# Patient Record
Sex: MALE | Race: WHITE | NOT HISPANIC OR LATINO | Employment: FULL TIME | URBAN - METROPOLITAN AREA
[De-identification: names, ages, dates, MRNs, and addresses within clinical notes are randomized per-mention and may not be internally consistent; named-entity substitution may affect disease eponyms.]

---

## 2018-03-30 ENCOUNTER — OFFICE VISIT CONVERTED (OUTPATIENT)
Dept: CARDIOLOGY | Facility: CLINIC | Age: 23
End: 2018-03-30
Attending: SPECIALIST

## 2018-05-04 ENCOUNTER — OFFICE VISIT CONVERTED (OUTPATIENT)
Dept: FAMILY MEDICINE CLINIC | Age: 23
End: 2018-05-04
Attending: NURSE PRACTITIONER

## 2018-09-04 ENCOUNTER — OFFICE VISIT CONVERTED (OUTPATIENT)
Dept: FAMILY MEDICINE CLINIC | Age: 23
End: 2018-09-04
Attending: NURSE PRACTITIONER

## 2018-09-24 ENCOUNTER — OFFICE VISIT CONVERTED (OUTPATIENT)
Dept: FAMILY MEDICINE CLINIC | Age: 23
End: 2018-09-24
Attending: NURSE PRACTITIONER

## 2019-05-15 ENCOUNTER — OFFICE VISIT CONVERTED (OUTPATIENT)
Dept: FAMILY MEDICINE CLINIC | Age: 24
End: 2019-05-15
Attending: NURSE PRACTITIONER

## 2019-07-12 ENCOUNTER — OFFICE VISIT CONVERTED (OUTPATIENT)
Dept: FAMILY MEDICINE CLINIC | Age: 24
End: 2019-07-12
Attending: NURSE PRACTITIONER

## 2019-10-02 ENCOUNTER — OFFICE VISIT CONVERTED (OUTPATIENT)
Dept: FAMILY MEDICINE CLINIC | Age: 24
End: 2019-10-02
Attending: NURSE PRACTITIONER

## 2019-10-09 ENCOUNTER — OFFICE VISIT CONVERTED (OUTPATIENT)
Dept: FAMILY MEDICINE CLINIC | Age: 24
End: 2019-10-09
Attending: FAMILY MEDICINE

## 2019-10-21 ENCOUNTER — OFFICE VISIT (OUTPATIENT)
Dept: ORTHOPEDIC SURGERY | Facility: CLINIC | Age: 24
End: 2019-10-21

## 2019-10-21 VITALS — BODY MASS INDEX: 39.66 KG/M2 | HEIGHT: 72 IN | WEIGHT: 292.8 LBS | TEMPERATURE: 98.1 F

## 2019-10-21 DIAGNOSIS — M25.552 BILATERAL HIP PAIN: ICD-10-CM

## 2019-10-21 DIAGNOSIS — M25.551 BILATERAL HIP PAIN: ICD-10-CM

## 2019-10-21 DIAGNOSIS — S42.292A OTHER CLOSED DISPLACED FRACTURE OF PROXIMAL END OF LEFT HUMERUS, INITIAL ENCOUNTER: Primary | ICD-10-CM

## 2019-10-21 DIAGNOSIS — M79.641 BILATERAL HAND PAIN: ICD-10-CM

## 2019-10-21 DIAGNOSIS — M79.642 BILATERAL HAND PAIN: ICD-10-CM

## 2019-10-21 DIAGNOSIS — M25.532 BILATERAL WRIST PAIN: ICD-10-CM

## 2019-10-21 DIAGNOSIS — M25.531 BILATERAL WRIST PAIN: ICD-10-CM

## 2019-10-21 DIAGNOSIS — M25.512 ACUTE PAIN OF LEFT SHOULDER: ICD-10-CM

## 2019-10-21 PROCEDURE — 73100 X-RAY EXAM OF WRIST: CPT | Performed by: PHYSICIAN ASSISTANT

## 2019-10-21 PROCEDURE — 99204 OFFICE O/P NEW MOD 45 MIN: CPT | Performed by: PHYSICIAN ASSISTANT

## 2019-10-21 PROCEDURE — 73120 X-RAY EXAM OF HAND: CPT | Performed by: PHYSICIAN ASSISTANT

## 2019-10-21 PROCEDURE — 23600 CLTX PROX HUMRL FX W/O MNPJ: CPT | Performed by: PHYSICIAN ASSISTANT

## 2019-10-21 PROCEDURE — 73060 X-RAY EXAM OF HUMERUS: CPT | Performed by: PHYSICIAN ASSISTANT

## 2019-10-21 PROCEDURE — 72170 X-RAY EXAM OF PELVIS: CPT | Performed by: PHYSICIAN ASSISTANT

## 2019-10-21 RX ORDER — PANTOPRAZOLE SODIUM 20 MG/1
20 TABLET, DELAYED RELEASE ORAL DAILY
COMMUNITY
End: 2021-10-15

## 2019-10-22 RX ORDER — HYDROCODONE BITARTRATE AND ACETAMINOPHEN 5; 325 MG/1; MG/1
TABLET ORAL
Qty: 40 TABLET | Refills: 0 | Status: SHIPPED | OUTPATIENT
Start: 2019-10-22 | End: 2019-10-30 | Stop reason: SDUPTHER

## 2019-10-22 NOTE — TELEPHONE ENCOUNTER
Patient is requesting a refill of Norco 5/325MG He was seen by Alhaji yesterday for a humerus fracture.     Please Advise. ALEXIS on file

## 2019-10-30 RX ORDER — HYDROCODONE BITARTRATE AND ACETAMINOPHEN 5; 325 MG/1; MG/1
TABLET ORAL
Qty: 40 TABLET | Refills: 0 | Status: SHIPPED | OUTPATIENT
Start: 2019-10-30 | End: 2019-12-09

## 2019-10-30 NOTE — TELEPHONE ENCOUNTER
Patient is requesting a refill of Norco 5/325MG Please Advise.     Left humerus fracture from 10/21/19 seen by S.     Please Advise.

## 2019-10-30 NOTE — TELEPHONE ENCOUNTER
Will be fine to renew his Norco prescription.  Please go ahead and send it to me electronically and I will sign off on it.  Thank you.  Norco 5/325 mg tab 1 p.o. every 6 total 30 pills no refills.

## 2019-10-31 PROBLEM — M25.512 ACUTE PAIN OF LEFT SHOULDER: Status: ACTIVE | Noted: 2019-10-31

## 2019-10-31 PROBLEM — M25.552 BILATERAL HIP PAIN: Status: ACTIVE | Noted: 2019-10-31

## 2019-10-31 PROBLEM — M25.551 BILATERAL HIP PAIN: Status: ACTIVE | Noted: 2019-10-31

## 2019-10-31 PROBLEM — M79.641 BILATERAL HAND PAIN: Status: ACTIVE | Noted: 2019-10-31

## 2019-10-31 PROBLEM — M25.531 BILATERAL WRIST PAIN: Status: ACTIVE | Noted: 2019-10-31

## 2019-10-31 PROBLEM — M25.532 BILATERAL WRIST PAIN: Status: ACTIVE | Noted: 2019-10-31

## 2019-10-31 PROBLEM — M79.642 BILATERAL HAND PAIN: Status: ACTIVE | Noted: 2019-10-31
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
## 2019-11-01 PROBLEM — S42.202A CLOSED FRACTURE OF LEFT PROXIMAL HUMERUS: Status: ACTIVE | Noted: 2019-11-01

## 2019-11-08 ENCOUNTER — OFFICE VISIT CONVERTED (OUTPATIENT)
Dept: FAMILY MEDICINE CLINIC | Age: 24
End: 2019-11-08
Attending: NURSE PRACTITIONER

## 2019-11-08 ENCOUNTER — HOSPITAL ENCOUNTER (OUTPATIENT)
Dept: OTHER | Facility: HOSPITAL | Age: 24
Discharge: HOME | End: 2019-11-08
Attending: NURSE PRACTITIONER

## 2019-11-10 LAB — BACTERIA SPEC AEROBE CULT: ABNORMAL

## 2019-11-18 ENCOUNTER — OFFICE VISIT (OUTPATIENT)
Dept: ORTHOPEDIC SURGERY | Facility: CLINIC | Age: 24
End: 2019-11-18

## 2019-11-18 VITALS — WEIGHT: 293 LBS | BODY MASS INDEX: 41.95 KG/M2 | HEIGHT: 70 IN | TEMPERATURE: 98 F

## 2019-11-18 DIAGNOSIS — R20.0 NUMBNESS OF RIGHT HAND: ICD-10-CM

## 2019-11-18 DIAGNOSIS — M79.641 BILATERAL HAND PAIN: ICD-10-CM

## 2019-11-18 DIAGNOSIS — S69.91XD HAND INJURY, RIGHT, SUBSEQUENT ENCOUNTER: ICD-10-CM

## 2019-11-18 DIAGNOSIS — M79.642 BILATERAL HAND PAIN: ICD-10-CM

## 2019-11-18 DIAGNOSIS — S42.202D CLOSED FRACTURE OF PROXIMAL END OF LEFT HUMERUS WITH ROUTINE HEALING, UNSPECIFIED FRACTURE MORPHOLOGY, SUBSEQUENT ENCOUNTER: Primary | ICD-10-CM

## 2019-11-18 PROCEDURE — 73130 X-RAY EXAM OF HAND: CPT | Performed by: PHYSICIAN ASSISTANT

## 2019-11-18 PROCEDURE — 99214 OFFICE O/P EST MOD 30 MIN: CPT | Performed by: PHYSICIAN ASSISTANT

## 2019-11-18 PROCEDURE — 73030 X-RAY EXAM OF SHOULDER: CPT | Performed by: PHYSICIAN ASSISTANT

## 2019-11-19 PROBLEM — S69.91XD HAND INJURY, RIGHT, SUBSEQUENT ENCOUNTER: Status: ACTIVE | Noted: 2019-11-19

## 2019-11-19 PROBLEM — R20.0 NUMBNESS OF RIGHT HAND: Status: ACTIVE | Noted: 2019-11-19

## 2019-11-19 RX ORDER — HYDROCODONE BITARTRATE AND ACETAMINOPHEN 5; 325 MG/1; MG/1
1 TABLET ORAL SEE ADMIN INSTRUCTIONS
Qty: 30 TABLET | Refills: 0 | Status: SHIPPED | OUTPATIENT
Start: 2019-11-19 | End: 2019-12-09

## 2019-11-19 NOTE — PROGRESS NOTES
GLOBAL FRACTURE CARE/FOLLOW UP      NAME: Cachorro Tsai  ?  : 1995  ?  MRN: [id number]    Chief Complaint   Patient presents with   • Left Shoulder - Follow-up   • Right Hand - Follow-up     ?  Date of fracture: 10/19/2019    HPI:   Patient returns today for 4-week follow up of left displaced proximal humerus fracture.  Patient reports he is still in a significant amount of pain with any attempted abduction or flexion of the arm. He has been out of pain medication for about one week and states ibuprofen is not helping much. He also reports numbness in the dorsum of the right hand since the incident. He states there is also some numbness into the right thumb. He denies any pain of the hand at this time. He denies any further injury and states the numbness is constant. He does not know of any modifying factors to make the numbness worse or better. The symptoms began shortly after the incident on 10/19/19. There is no impaired  strength.    This patient is an established patient.  The complaint of numbness in right hand is new to this provider.    History reviewed. No pertinent past medical history.  Family History   Problem Relation Age of Onset   • Diabetes Other    • Hypertension Other      Social History     Tobacco Use   • Smoking status: Current Every Day Smoker   • Smokeless tobacco: Never Used   Substance Use Topics   • Alcohol use: Yes   • Drug use: Defer     Past Surgical History:   Procedure Laterality Date   • WISDOM TOOTH EXTRACTION         Current Outpatient Medications:   •  HYDROcodone-acetaminophen (NORCO) 5-325 MG per tablet, Take 1 tablet by mouth every 4-6 hours prn pain, Disp: 40 tablet, Rfl: 0  •  pantoprazole (PROTONIX) 20 MG EC tablet, Take 20 mg by mouth Daily., Disp: , Rfl:       Review of Systems   Constitutional: Negative.    HENT: Negative.    Eyes: Negative.    Respiratory: Negative.    Cardiovascular: Negative.    Endocrine: Negative.    Musculoskeletal: Positive for  arthralgias.   Skin: Negative.    Allergic/Immunologic: Negative.    Neurological: Positive for numbness (right hand).   Hematological: Negative.    Psychiatric/Behavioral: Negative.        Physical Exam  Constitutional: Patient is oriented to person, place, and time. Appears well-developed and well-nourished.   HENT:   Head: Normocephalic and atraumatic.   Eyes: Conjunctivae and EOM are normal. Pupils are equal, round, and reactive to light.   Cardiovascular: Normal rate and intact distal pulses.   Pulmonary/Chest: Effort normal and breath sounds normal.   Musculoskeletal:   See detailed exam below   Neurological: Alert and oriented to person, place, and time.  Coordination normal. There is disturbance of 2 point discrimination of the dorsum of the right hand.  Skin: Skin is warm and dry. Capillary refill takes less than 2 seconds. No rash noted. No erythema.   Psychiatric: Patient has a normal mood and affect. His behavior is normal. Judgment and thought content normal.   Nursing note and vitals reviewed.      Ortho Exam:   Left shoulder-proximal humerus fracture. There continues to be a significant amount of tenderness over the greater tuberosity of the humerus. There is some tenderness over the lesser tuberosity as well. External rotation is extremely limited and painful. Rotator cuff function is inhibited because of pain and tenderness at the insertion of the rotator cuff on the greater tuberosity of the humerus. Axillary nerve function is well preserved. Biceps function is intact. Radial artey pulses are palpable. Apprehension sign is negative. Both active and passive ranges of motion are extremely limited and painful for the patient. There is no clinical deformity. The pain level is 7.    Right hand: The skin is within normal limits and without bruising or swelling. Radial pulse is palpable. Capillary refill is 2 seconds with a brisk return. There is some altered 2 point discrimination over the dorsum of the  hand.  strength is within normal limits.  There is no muscle wasting or atrophy.  Flexor and extensor tendon functions are well preserved. No evidence of RSD.  There is no clinical evidence of renal disease, thyroid disease or any generalized neurological condition that could be causing nerve dysfunction.      Diagnostic Studies:  X-Ray Report:  Left shoulder(s) X-Ray  Indication: Evaluation of left shoulder pain  AP, Lateral views  Findings: displaced fracture of left humeral head with displaced fracture fragments proximal to humeral head, no acute change in fracture fragments since last x-rays  Bony lesion: no  Soft tissues: within normal limits  Joint spaces: abnormal  Hardware appropriately positioned: not applicable  Prior studies available for comparison: yes   X-RAY was ordered and reviewed by Alhaji Pryor PA-C    X-Ray Report:  Right Hand(s) X-Ray  Indication: Evaluation of right hand numbness  AP, Lateral views  Findings: No acute bony abnormality seen on x-rays.  At last visit we discussed a possible old fracture at the fifth metacarpal versus an acute fracture, after seeing today's x-rays it is confirmed that the area is not acute.  Bony lesion: no  Soft tissues: within normal limits  Joint spaces: within normal limits  Hardware appropriately positioned: not applicable  Prior studies available for comparison: yes   X-RAY was ordered and reviewed by Alhaji Pryor PA-C        Assessment:  Cachorro was seen today for follow-up and follow-up.    Diagnoses and all orders for this visit:    Closed fracture of proximal end of left humerus with routine healing, unspecified fracture morphology, subsequent encounter  -     XR Shoulder 2+ View Left  -     MRI Shoulder Left Without Contrast; Future    Bilateral hand pain  -     XR Hand 3+ View Right    Numbness of right hand  -     EMG & Nerve Conduction Test; Future    Hand injury, right, subsequent encounter  -     EMG & Nerve Conduction Test;  Future          Plan   · We will discuss further pain control with Dr. Holloway and send in to patient's pharmacy.  · Discussed proceeding with left shoulder MRI to further evaluate the rotator cuff and location of fracture fragments.  · We will proceed with ordering an EMG/nerve conduction study of the right upper extremity to further assess numbness on the dorsum of the right hand.  Continue ice as needed  Gentle active ROM-we discussed referral to formal physical therapy but will attempt to have MRI back prior to doing so due to the fracture fragment placement.  OTC tylenol or ibuprofen as needed (if clinically appropriate)  Fall precautions  Patient is to remain off work    Date of encounter: 11/18/2019  Alhaji Pryor PA-C      Electronically signed by Alhaji Pryor PA-C, 11/19/19, 12:14 PM.

## 2019-11-21 ENCOUNTER — TELEPHONE (OUTPATIENT)
Dept: ORTHOPEDIC SURGERY | Facility: CLINIC | Age: 24
End: 2019-11-21

## 2019-12-05 ENCOUNTER — TELEPHONE (OUTPATIENT)
Dept: ORTHOPEDIC SURGERY | Facility: CLINIC | Age: 24
End: 2019-12-05

## 2019-12-05 NOTE — TELEPHONE ENCOUNTER
Patient had a anxiety attack when he was having his MRI. Per Dr. Holloway we will call in Valium 10MG

## 2019-12-09 ENCOUNTER — OFFICE VISIT (OUTPATIENT)
Dept: ORTHOPEDIC SURGERY | Facility: CLINIC | Age: 24
End: 2019-12-09

## 2019-12-09 VITALS — WEIGHT: 293 LBS | BODY MASS INDEX: 39.68 KG/M2 | TEMPERATURE: 98.1 F | HEIGHT: 72 IN

## 2019-12-09 DIAGNOSIS — S46.012A TRAUMATIC COMPLETE TEAR OF LEFT ROTATOR CUFF, INITIAL ENCOUNTER: ICD-10-CM

## 2019-12-09 DIAGNOSIS — S64.8X2A: ICD-10-CM

## 2019-12-09 DIAGNOSIS — R20.0 NUMBNESS OF RIGHT HAND: ICD-10-CM

## 2019-12-09 DIAGNOSIS — S54.22XA LEFT RADIAL SENSORY NERVE INJURY, INITIAL ENCOUNTER: ICD-10-CM

## 2019-12-09 DIAGNOSIS — S42.202D CLOSED FRACTURE OF PROXIMAL END OF LEFT HUMERUS WITH ROUTINE HEALING, UNSPECIFIED FRACTURE MORPHOLOGY, SUBSEQUENT ENCOUNTER: Primary | ICD-10-CM

## 2019-12-09 PROCEDURE — 99024 POSTOP FOLLOW-UP VISIT: CPT | Performed by: PHYSICIAN ASSISTANT

## 2019-12-09 RX ORDER — HYDROCODONE BITARTRATE AND ACETAMINOPHEN 5; 325 MG/1; MG/1
1 TABLET ORAL EVERY 12 HOURS PRN
Qty: 20 TABLET | Refills: 0 | Status: CANCELLED | OUTPATIENT
Start: 2019-12-09

## 2019-12-10 DIAGNOSIS — S42.202D CLOSED FRACTURE OF PROXIMAL END OF LEFT HUMERUS WITH ROUTINE HEALING, UNSPECIFIED FRACTURE MORPHOLOGY, SUBSEQUENT ENCOUNTER: Primary | ICD-10-CM

## 2019-12-10 DIAGNOSIS — S46.012A TRAUMATIC COMPLETE TEAR OF LEFT ROTATOR CUFF, INITIAL ENCOUNTER: ICD-10-CM

## 2019-12-10 PROBLEM — S64.8X2A: Status: ACTIVE | Noted: 2019-12-10

## 2019-12-10 PROBLEM — S54.22XA: Status: ACTIVE | Noted: 2019-12-10

## 2019-12-10 RX ORDER — HYDROCODONE BITARTRATE AND ACETAMINOPHEN 5; 325 MG/1; MG/1
TABLET ORAL
Qty: 20 TABLET | Refills: 0 | Status: SHIPPED | OUTPATIENT
Start: 2019-12-10 | End: 2021-10-15

## 2019-12-10 NOTE — PROGRESS NOTES
FOLLOW UP VISIT    Patient: Cachorro Tsai  ?  YOB: 1995    MRN: [id number]  ?  Chief Complaint   Patient presents with   • Left Shoulder - Results, Pain   • Right Hand - Results, Numbness      ?  HPI:   23-year-old male patient returns today for follow-up on left shoulder pain, right hand numbness and results of EMG/NCS and MRI.  I have been treating patient for left displaced proximal humerus fracture following an injury in October of this year.  Since that time he is been complaining of numbness in the right hand and significant pain and difficulty with any movement of the left arm.  Today we will discuss EMG/NCS findings of right hand/right upper extremity and MRI findings of left shoulder.  Patient reports he is still in a moderate amount of discomfort, mostly at nighttime when trying to sleep.  He has been using the hydrocodone given at past visits for bedtime.  Pain in his shoulder is described as an aching/grinding/crushing pain that is moderate to severe in nature.  He reports pain constantly although it is worsened if he moves the left arm and feels better if he can rest it.  He also reports no range of motion and decreased strength as well as popping with any attempted movement of the shoulder.  He has been unable to return to work since date of injury.  In regards to his right hand he denies any decreased  strength and denies any pain but does continue to experience constant numbness in the hand.    This patient is an established patient.  This problem is not new to this examiner.      Allergies: No Known Allergies    Medications:   Home Medications:  Current Outpatient Medications on File Prior to Visit   Medication Sig   • pantoprazole (PROTONIX) 20 MG EC tablet Take 20 mg by mouth Daily.     No current facility-administered medications on file prior to visit.      Current Medications:  Scheduled Meds:  PRN Meds:.    I have reviewed the patient's medical history in detail and  "updated the computerized patient record.  Review and summarization of old records include:    History reviewed. No pertinent past medical history.  Past Surgical History:   Procedure Laterality Date   • WISDOM TOOTH EXTRACTION       Social History     Occupational History   • Not on file   Tobacco Use   • Smoking status: Current Every Day Smoker   • Smokeless tobacco: Never Used   Substance and Sexual Activity   • Alcohol use: Yes   • Drug use: Defer   • Sexual activity: Defer      Social History     Social History Narrative   • Not on file     Family History   Problem Relation Age of Onset   • Diabetes Other    • Hypertension Other          Review of Systems  Constitutional: Negative.    HENT: Negative.    Eyes: Negative.    Respiratory: Negative.    Cardiovascular: Negative.    Endocrine: Negative.    Musculoskeletal: Positive for arthralgias, decreased ROM, decreased strength.  Skin: Negative.    Allergic/Immunologic: Negative.    Neurological: Negative.    Hematological: Negative.    Psychiatric/Behavioral: Negative.           Wt Readings from Last 3 Encounters:   12/09/19 133 kg (293 lb)   11/18/19 133 kg (293 lb)   10/21/19 133 kg (292 lb 12.8 oz)     Ht Readings from Last 3 Encounters:   12/09/19 182.9 cm (72\")   11/18/19 177.8 cm (70\")   10/21/19 182.9 cm (72\")     Body mass index is 39.74 kg/m².  Facility age limit for growth percentiles is 20 years.  Vitals:    12/09/19 1537   Temp: 98.1 °F (36.7 °C)         Physical Exam  Constitutional: Patient is oriented to person, place, and time. Appears well-developed and well-nourished.   HENT:   Head: Normocephalic and atraumatic.   Eyes: Conjunctivae and EOM are normal. Pupils are equal, round, and reactive to light.   Cardiovascular: Normal rate and intact distal pulses.   Pulmonary/Chest: Effort normal and breath sounds normal.   Musculoskeletal:   See detailed exam below   Neurological: Alert and oriented to person, place, and time. No sensory deficit. " Coordination normal.   Skin: Skin is warm and dry. Capillary refill takes less than 2 seconds. No rash noted. No erythema.   Psychiatric: Patient has a normal mood and affect. His behavior is normal. Judgment and thought content normal.   Nursing note and vitals reviewed.      Ortho Exam:   Left shoulder-rct and proximal humerus fracture:   The shoulder is extremely tender anteriorly. There is tenderness over the insertion of the rotator cuff over the greater tuberosity of the humerus. Proximal migration of the humeral head is noted. AC joint is tender. Crossover adduction test is positive. Drop arm sign is positive. Forward flexion is 0-20 degrees, abduction is 0-20 degrees, and unable to perform external rotation. Sulcus sign is negative.  Skin and soft tissue tenderness is noted. Patient's strength in abduction and external rotation are extremely lacking. The pain level is 8.       Right hand:   The skin remains within normal limits and without bruising or swelling. Radial pulse is palpable. Capillary refill is 2 seconds with a brisk return. There is altered 2 point discrimination over the dorsum of the hand.  strength is within normal limits.  There is no muscle wasting or atrophy.  Flexor and extensor tendon functions are well preserved. No evidence of RSD.  There is no clinical evidence of renal disease, thyroid disease or any generalized neurological condition that could be causing nerve dysfunction.    The above information is historical although it has been reviewed and updated to reflect current findings.    Diagnostics:  Reviewed EMG/NCS test results, performed [facility]/3/[facility], with summary of impression below:  · Compromise of right superficial radial and dorsal ulnar cutaneous sensory nerves.  Responses are present but reduced compared to opposite arm.  · EMG shows normal study    Reviewed MRI results of left shoulder, performed 12/9/2019 at [facility], summary of impression  below:  · Full-thickness and full width tears of supraspinatus and infraspinatus tendons with retraction to 12 o'clock position of humeral head  · No labral tear  · Long head biceps tendon is intact  · Small glenohumeral joint effusion  · Mild atrophy of supraspinatus muscle  · Discussed MRI findings of the left shoulder with performing physician, Miguel Sims DO regarding humerus fracture.  There is no mention of fracture fragments on MRI but upon talking with Dr. Sims via telephone he advises he sees the fragments and will make an addendum.       Assessment:  Cachorro was seen today for results, pain, results and numbness.    Diagnoses and all orders for this visit:    Closed fracture of proximal end of left humerus with routine healing, unspecified fracture morphology, subsequent encounter  -     [facility]Procedural Request  -     Urinalysis With Culture If Indicated -; Future    Numbness of right hand  -     Ambulatory Referral to Neurology    Traumatic complete tear of left rotator cuff, initial encounter  -     [facility] Surgical/Procedural Request  -     Urinalysis With Culture If Indicated -; Future    Injury of cutaneous sensory nerve of left upper extremity at hand level, initial encounter  -     Ambulatory Referral to Neurology    Left radial sensory nerve injury, initial encounter  -     Ambulatory Referral to Neurology    Other orders  -     Follow Anesthesia Guidelines / Standing Orders; Future  -     Obtain informed consent        ?    Plan    · Discussed results with patient and his mother as well as treatment options.  Patient will be referred to Dr. Bacilio Llody for neurology consult regarding right hand symptoms.  Patient will also be scheduled for a left rotator cuff repair with Dr. Thai Holloway.  · Physical Therapy discussed   · Medication options discussed and recommended  · Surgical  options discussed and patient scheduled for left rotator cuff  repair  · Rest, ice, compression, and elevation (RICE) therapy  · Stretching and strengthening exercises  · Refill on hydrocodone sent to patient's pharmacy  Patient has been diagnosed with a rotator cuff tear.  These tears can range from partial tears to complete tears of the muscle and/or tendon. They can also be categorized in size by width as being small, medium or large.  Diagnosis is confirmed with MRI or CT/arthrogram.  Management of these tears can be conservative with injections, physical therapy, activity modification and use of NSAIDS as needed.  Surgery is the only way to actually fix these tears, as they will not heal or repair on their own.  These tears can usually be repaired with arthroscopic surgery, but occasionally open procedures are necessary.  Many factors can influence the outcomes. First, larger tears have a higher chance of failure from a healing perspective, although pain may improve nonetheless, potentially longer recovery and sometimes, depending on the quality of the tissue and the length of time the tear has been present, may not be repairable at all.  If the tear has been going on for a long time, the muscle can actually change to fatty tissue, and no longer act as muscle.  This can have a direct effect on not only the ability to repair the tear but also the outcome from the surgery itself both from a healing and functional perspective.  Therefore, the decision to proceed with surgery does have a time factor which can affect the quality of the tissue and reparability but also the potential for the tear to increase in size.  The longer you wait to repair the torn tendon there can be decreased potential for a successful outcome.  · Risks of surgery have been discussed with the patient in detail.  These risks include but are not limited to possibility of infection, DVT, continued pain, continued progression of arthritis, use of allograft tissue, limited range of motion and strength and  further surgical intervention.  Patient understands that the surgery will benefit mechanical symptoms of pain and instability but may not help with symptoms of arthritis.      Date of encounter: 12/09/2019   Alhaji Pryor PA-C    Electronically signed by Alhaji Pryor PA-C, 12/10/19, 7:33 AM.

## 2019-12-13 ENCOUNTER — OUTSIDE FACILITY SERVICE (OUTPATIENT)
Dept: ORTHOPEDIC SURGERY | Facility: CLINIC | Age: 24
End: 2019-12-13

## 2019-12-13 PROCEDURE — 23410 REPAIR ROTATOR CUFF ACUTE: CPT | Performed by: ORTHOPAEDIC SURGERY

## 2019-12-13 PROCEDURE — 29824 SHO ARTHRS SRG DSTL CLAVICLC: CPT | Performed by: ORTHOPAEDIC SURGERY

## 2019-12-20 ENCOUNTER — OFFICE VISIT (OUTPATIENT)
Dept: ORTHOPEDIC SURGERY | Facility: CLINIC | Age: 24
End: 2019-12-20

## 2019-12-20 VITALS — TEMPERATURE: 98.2 F | BODY MASS INDEX: 39.55 KG/M2 | HEIGHT: 72 IN | WEIGHT: 292 LBS

## 2019-12-20 DIAGNOSIS — S46.012D TRAUMATIC COMPLETE TEAR OF LEFT ROTATOR CUFF, SUBSEQUENT ENCOUNTER: Primary | ICD-10-CM

## 2019-12-20 PROCEDURE — 99024 POSTOP FOLLOW-UP VISIT: CPT | Performed by: ORTHOPAEDIC SURGERY

## 2019-12-20 RX ORDER — OXYCODONE HYDROCHLORIDE AND ACETAMINOPHEN 5; 325 MG/1; MG/1
1 TABLET ORAL EVERY 8 HOURS PRN
Qty: 30 TABLET | Refills: 0 | Status: SHIPPED | OUTPATIENT
Start: 2019-12-20 | End: 2021-10-15

## 2019-12-20 NOTE — PROGRESS NOTES
OTHER POST OP GLOBAL     NAME: Cachorro Tsai  ?  : 1995  ?  MRN: [id number]    Chief Complaint   Patient presents with   • Left Shoulder - Follow-up     ?  Date of surgery: The patient had a rotator cuff repair surgery on 2019.    HPI:   Patient returns today for 1 week follow up of left rotator cuff repair. Incision(s) and Arthroscopic portals healing nicely/as expected with no signs of infection. Patient reports doing well with no unusual complaints. Appears to be progressing appropriately.  He has appropriate amounts of swelling and bruising.  He is able to move his fingers without any problems.  There is no neurovascular compromise postoperatively.      Review of Systems:      Ortho Exam:   Left shoulder. The patient is status-post rotator cuff repair 1 week(s) . Incision is clean and healing well. Arthroscopic portals are clean. Appropriate amounts of swelling and bruising are noted. The patients pain is well controlled. The passive range of motion is abduction 0-30 degrees, flexion 0-30 degrees. Axillary nerve function is well preserved. Radial artery pulses are palpable.      Diagnostic Studies:  no diagnostic testing performed this visit      Assessment:  Cachorro was seen today for follow-up.    Diagnoses and all orders for this visit:    Traumatic complete tear of left rotator cuff, subsequent encounter          Plan   • Incision care  • Continue ice as needed  • Active/Active Assisted ROM of elbow, wrist and hand  • Stretching and strengthening exercises  • Tylenol 500-1000mg by mouth every 6 hours as needed for pain   • Fall precautions  • Follow up in 4 week(s).  • Given the patient a prescription of Percocet 5/325 mg tab 1 p.o. every 8 PRN pain total 30 pills no refills.  I have encouraged him to cut back on his pain medication and to use ibuprofen in conjunction.  • Given the patient a note to be off work.  • We will start aggressive physical therapy upon his return to the  office for reevaluation in 4 weeks.  • Controlled substance treatment options discussed in detail. Patient's signed consent to medical options on file. ALEXIS form in chart.  The patient is being provided this narcotic prescription to address the acute medical condition that they are undergoing/experiencing at this time.  It is my medical and surgical assessment that more than 3 days of narcotic medication is necessary to help control the pain and discomfort that this patient is experiencing at this point.  Risks of narcotic medication usage outlined.  Possibility of physical and psychological dependence and abuse, especially long term, emphasized to the patient.  I have explained to the patient that we will try to wean them off the narcotic medication as soon as possible and introduce non-narcotic modalities of pain control.  At this point in the patient's clinical spectrum, however, alternative available treatments are inadequate to control their pain and symptoms.  I have also discussed the possibility of random drug testing as well as pill counts to prevent misuse and misappropriation of the narcotic medications prescribed to the patient.    Date of encounter: 12/20/2019  Thai Holloway MD

## 2020-01-17 ENCOUNTER — OFFICE VISIT (OUTPATIENT)
Dept: ORTHOPEDIC SURGERY | Facility: CLINIC | Age: 25
End: 2020-01-17

## 2020-01-17 VITALS — BODY MASS INDEX: 39.74 KG/M2 | WEIGHT: 293.4 LBS | HEIGHT: 72 IN | TEMPERATURE: 97.7 F

## 2020-01-17 DIAGNOSIS — Z98.890 S/P LEFT ROTATOR CUFF REPAIR: Primary | ICD-10-CM

## 2020-01-17 DIAGNOSIS — S46.012D TRAUMATIC COMPLETE TEAR OF LEFT ROTATOR CUFF, SUBSEQUENT ENCOUNTER: ICD-10-CM

## 2020-01-17 DIAGNOSIS — S42.202D CLOSED FRACTURE OF PROXIMAL END OF LEFT HUMERUS WITH ROUTINE HEALING, UNSPECIFIED FRACTURE MORPHOLOGY, SUBSEQUENT ENCOUNTER: ICD-10-CM

## 2020-01-17 PROCEDURE — 99024 POSTOP FOLLOW-UP VISIT: CPT | Performed by: PHYSICIAN ASSISTANT

## 2020-01-28 PROBLEM — Z98.890 S/P LEFT ROTATOR CUFF REPAIR: Status: ACTIVE | Noted: 2020-01-28

## 2020-01-28 NOTE — PROGRESS NOTES
OTHER POST OP GLOBAL     NAME: Cachorro Tsai  ?  : 1995  ?  MRN: [id number]    Chief Complaint   Patient presents with   • Left Shoulder - Post-op, Follow-up     ?  Date of surgery: 2019    HPI:   Patient returns today for 5 week follow up of left rotator cuff repair. Incision/arthroscopic portals healed nicely with no signs of infection.  There are what appears to be 2 retained sutures at the most distal and proximal ends of the incision.  Patient reports doing well with no unusual complaints.  He reports his pain is much better than it was prior to surgery.  He is able to sleep more comfortably now and move the shoulder without significant pain he had prior.      Review of Systems:      Ortho Exam:   Left shoulder:  The patient is status-post rotator cuff repair 5 week(s) . Incision has healed well. Arthroscopic portals are clean. Appropriate amounts of swelling and bruising are noted. The patients pain is well controlled. The passive range of motion is abduction 0-90 degrees, flexion 0-100 degrees. Axillary nerve function is well preserved. Radial artery pulses are palpable.      Diagnostic Studies:  no diagnostic testing performed this visit      Assessment:  Cachorro was seen today for post-op and follow-up.    Diagnoses and all orders for this visit:    Traumatic complete tear of left rotator cuff, subsequent encounter  -     Ambulatory Referral to Physical Therapy Evaluate and treat, POST OP    S/P left rotator cuff repair  -     Ambulatory Referral to Physical Therapy Evaluate and treat, POST OP    Closed fracture of proximal end of left humerus with routine healing, unspecified fracture morphology, subsequent encounter  -     Ambulatory Referral to Physical Therapy Evaluate and treat, POST OP          Plan   Incision care-to retained sutures were removed in office  Continue ice as needed  Stretching and strengthening exercises/to begin PT/OT -referral has been made  Alternate Ibuprofen  and Tylenol as needed  Fall precautions  He is to remain off work until next follow-up  Follow up in 4 weeks with Dr. Holloway    Date of encounter: 01/17/2020  Alhaji Pryor PA-C      Electronically signed by Alhaji Pryor PA-C, 01/28/20, 6:52 PM.

## 2020-02-13 ENCOUNTER — OFFICE VISIT (OUTPATIENT)
Dept: ORTHOPEDIC SURGERY | Facility: CLINIC | Age: 25
End: 2020-02-13

## 2020-02-13 VITALS — HEIGHT: 72 IN | TEMPERATURE: 97.7 F | WEIGHT: 295 LBS | BODY MASS INDEX: 39.96 KG/M2

## 2020-02-13 DIAGNOSIS — Z09 POSTOPERATIVE FOLLOW-UP: ICD-10-CM

## 2020-02-13 DIAGNOSIS — Z98.890 S/P LEFT ROTATOR CUFF REPAIR: Primary | ICD-10-CM

## 2020-02-13 PROCEDURE — 99024 POSTOP FOLLOW-UP VISIT: CPT | Performed by: ORTHOPAEDIC SURGERY

## 2020-02-13 NOTE — PROGRESS NOTES
OTHER POST OP GLOBAL     NAME: Cachorro Tsai  ?  : 1995  ?  MRN: [id number]    Chief Complaint   Patient presents with   • Left Shoulder - Follow-up, Post-op     ?  Date of surgery: Rotator cuff repair on 2019.    HPI:   Patient returns today for 8 week follow up of left rotator cuff repair. Incision(s) and Arthroscopic portals healed nicely with no signs of infection. Patient reports doing well with no unusual complaints. Appears to be progressing appropriately.  States that he is doing very well postoperatively.  He is not using any narcotic medication at this point.  His range of motion is slowly and progressively improving.  He states that he has completed his course of physical therapy and is currently under a home exercise program.  He also states that unfortunately he has been fired from his job and is looking for a new position.  He was terminated because he could not attend his normal duties on account of injury to his shoulder and subsequent surgical requirements.      Review of Systems:      Ortho Exam:   Left shoulder. The patient is status-post rotator cuff repair 8 week(s) . Incision is clean and healing well. Arthroscopic portals are clean. Appropriate amounts of swelling and bruising are noted. The patients pain is well controlled. The passive range of motion is abduction 0-120 degrees, flexion 0-120 degrees. Axillary nerve function is well preserved. Radial artery pulses are palpable.      Diagnostic Studies:  no diagnostic testing performed this visit      Assessment:  Cachorro was seen today for follow-up and post-op.    Diagnoses and all orders for this visit:    S/P left rotator cuff repair          Plan   • Incision care  • Continue ice as needed  • Aggressive ROM  • Stretching and strengthening exercises  • Tylenol 500-1000mg by mouth every 6 hours as needed for pain   • Fall precautions  • Follow up in 6 week(s)     Date of encounter: 2020  Thai Holloway  MD

## 2021-05-16 VITALS
SYSTOLIC BLOOD PRESSURE: 134 MMHG | HEART RATE: 80 BPM | BODY MASS INDEX: 41.31 KG/M2 | HEIGHT: 72 IN | DIASTOLIC BLOOD PRESSURE: 92 MMHG | WEIGHT: 305 LBS

## 2021-05-18 NOTE — PROGRESS NOTES
Cachorro Tsai 1995     Office/Outpatient Visit    Visit Date: Wed, Oct 2, 2019 10:15 am    Provider: Merly Ward N.P. (Assistant: Naomy Nguyễn MA)    Location: [facility]        Electronically signed by Merly Ward N.P. on  10/02/2019 05:21:30 PM                             SUBJECTIVE:        CC:     Aguila is a 23 year old White male.  presents today due to complaints of right ankle and right wrist pain X 4 days, no known injury         HPI:         Patient complains of wrist pain.      Aguila complains of left wrist pain.  It is located distally and overt he left forearm up into the lower part of the elbow.  The pain radiates to the forearm.  He describes it as intermittent and moderate in intensity.  Associated symptoms include pain not present when resting, when weight bearing, very painful He denies associated erythema or numbness.  The initial onset of discomfort was 3 days ago.  There was no apparent precipitating injury.  Possibly related factors include occupation-related repetitive wrist motions.          Concerning ankle pain, the pain is primarily in the right ankle.  Pain radiates to the forefoot, shin, and knee.  He characterizes it as intermittent, moderate in intensity, aching, and dull.  It began 4 days ago.  No precipitating event or injury is identified.  Pain is aggravated with inversion and eversion.  He denies associated ankle instability, numbness and swelling.      ROS:     CONSTITUTIONAL:  Negative for chills, fatigue and fever.      CARDIOVASCULAR:  Negative for chest pain, orthopnea, paroxysmal nocturnal dyspnea and pedal edema.      RESPIRATORY:  Negative for dyspnea and cough.      GASTROINTESTINAL:  Negative for abdominal pain, heartburn, constipation, diarrhea, and stool changes.      MUSCULOSKELETAL:  Positive for right ankle, left wrist/forearm.      NEUROLOGICAL:  Negative for paresthesias.          PMH/FMH/SH:     Last Reviewed on  7/12/2019 04:25 PM by Merly Ward    Past Medical History:         Attention Deficit Disorder: dx'd at age 2 yr;     Chicken pox: dx'd at age 1;         Surgical History:     NONE         Family History:         Paternal Grandfather: Hypertension     Paternal Grandmother: Carotid Artery Stenosis     Maternal Grandfather: Coronary Artery Disease; Myocardial Infarction     Maternal Grandmother: Coronary Artery Disease;  Type 2 Diabetes Father:    Positive for Hypertension and Hyperlipidemia;     Mother: Healthy         Social History:     Occupation: [employer]     Marital Status: Single     Children: None         Tobacco/Alcohol/Supplements:     Last Reviewed on 10/02/2019 10:18 AM by Naomy Nguyễn    Tobacco: Current Smoker: He currently smokes every day, 1/2 to 1 pack per day.          Alcohol: Frequency: Just on weekends         Substance Abuse History:     Last Reviewed on 7/12/2019 04:25 PM by Merly Ward        Marijuana: Current use.          Mental Health History:     Last Reviewed on 7/25/2016 02:38 PM by Merly Ward        Communicable Diseases (eg STDs):     Last Reviewed on 7/25/2016 02:38 PM by Merly Ward            Current Problems:     Last Reviewed on 7/12/2019 04:25 PM by Merly Ward    Depression with anxiety     Acne     GERD     Cigarette smoking     Tendonitis (ankle)     Wrist tendonitis     Screening for depression         Immunizations:     DTaP 4/15/1994     DTaP 6/9/1994     DTaP 8/18/1994     DTaP 3/22/1995     DTaP 5/12/1998     Td adult 7/1/2004     Hib HbOC (4-dose) 4/15/1994     Hib HbOC (4-dose) 6/9/1994     Hib HbOC (4-dose) 8/18/1994     Hib HbOC (4-dose) 3/22/1995     Hep B (pedi/adol, 3-dose schedule) 1995     Hep B (pedi/adol, 3-dose schedule) 4/15/1994     Hep B (pedi/adol, 3-dose schedule) 3/22/1995     Novartis Meningioccal or Menactra 6/13/2007     OPV  Poliovirus, live (oral) 4/15/1994     OPV  Poliovirus, live (oral) 6/9/1994     OPV   "Poliovirus, live (oral) 8/18/1994     OPV  Poliovirus, live (oral) 5/12/1998     MMR  (Measles-Mumps-Rubella), live 3/12/1995     MMR  (Measles-Mumps-Rubella), live 5/12/1998     FluMist 8/31/2009         Allergies:     Last Reviewed on 10/02/2019 10:17 AM by Naomy Nguyễn      No Known Drug Allergies.         Current Medications:     Last Reviewed on 10/02/2019 10:17 AM by Naomy Nguyễn    Buspirone HCl 15mg Tablet Take 1 tablet(s) by mouth bid prn     Sertraline HCl 50mg Tablet 1/2 a day for first week then one a day     Protonix 20mg Tablets, Delayed Release Take 1 tablet(s) by mouth daily     \"peak performance pills\"         OBJECTIVE:        Vitals:         Current: 10/2/2019 10:19:07 AM    Ht:  5 ft, 11.25 in;  Wt: 293 lbs;  BMI: 40.6    T: 98.4 F (oral);  BP: 125/81 mm Hg (right arm, sitting);  P: 67 bpm (right arm (BP Cuff), sitting);  sCr: 1.11 mg/dL;  GFR: 121.73        Exams:     PHYSICAL EXAM:     GENERAL: Vitals recorded well developed, well nourished;  well groomed;  no apparent distress;     RESPIRATORY: normal respiratory rate and pattern with no distress; normal breath sounds with no rales, rhonchi, wheezes or rubs;     CARDIOVASCULAR: normal rate; rhythm is regular;  normal S1; normal S2; no systolic murmur; no cyanosis; no edema;     SKIN:  no significant rashes or lesions; no suspicious moles;     MUSCULOSKELETAL: normal gait; pain with range of motion in: left elbow weightbearing;  right ankle inversion and eversion;     NEUROLOGICAL:  cranial nerves, motor and sensory function, reflexes, gait and coordination are all intact;     PSYCHIATRIC:  appropriate affect and demeanor; normal speech pattern; grossly normal memory;         ASSESSMENT:           726.4   M77.8  Wrist tendonitis              DDx:     726.90   M77.9  Tendonitis (ankle)              DDx:         ORDERS:         Meds Prescribed:       Diclofenac Sodium 75mg Tablets, Enteric Coated 1 tab bid with food  #30 (Thirty) tablet(s) " Refills: 2                 PLAN:          Wrist tendonitis splinting/ arm band when active - only remove during sleep - ice     School/Work Excuse for Monday-Wednesday           Prescriptions:       Diclofenac Sodium 75mg Tablets, Enteric Coated 1 tab bid with food  #30 (Thirty) tablet(s) Refills: 2          Tendonitis (ankle) rest, ice - NSAIDs as prescribed             CHARGE CAPTURE:           Primary Diagnosis:     726.4 Wrist tendonitis            M77.8    Other enthesopathies, not elsewhere classified              Orders:          [id number]   Office/outpatient visit; established patient, level 3  (In-House)           726.90 Tendonitis (ankle)            M77.9    Enthesopathy, unspecified

## 2021-05-18 NOTE — PROGRESS NOTES
Quin Cachorro VARGASEd 1995     Office/Outpatient Visit    Visit Date: Tue, Sep 4, 2018 05:18 pm    Provider: Merly Ward N.P. (Assistant: Sarah Spurling, MA)    Location: [facility]        Electronically signed by Merly Ward N.P. on  09/06/2018 09:20:38 AM                             SUBJECTIVE:        CC:     Aguila is a 22 year old White male.  Right knee pain         HPI:         Patient to be evaluated for knee pain.  The affected area is the right knee.  for knee pain.  The injury occurred week ago.  Pain began 1 week ago.  He describes the intensity of pain as varying from mild to moderate.  Pain description: stabbing.  Patient does not remember the cause of the injury.  he does work on his feet constantly stooping and crouching down Other details: yesterday felt a pop in the knee - feels unstable  -.      ROS:     CONSTITUTIONAL:  Negative for chills, fatigue and fever.      CARDIOVASCULAR:  Negative for chest pain, pedal edema and edema.      RESPIRATORY:  Negative for recent cough and dyspnea.      GASTROINTESTINAL:  Negative for abdominal pain, constipation, diarrhea, heartburn, nausea and vomiting.      MUSCULOSKELETAL:  Positive for arthralgias and (right knee) limb pain ( right knee ).   Negative for myalgias.      INTEGUMENTARY:  Negative for pruritis, rash and erythema.      NEUROLOGICAL:  Positive for weakness ( right knee ).   Negative for paresthesias.          PMH/FMH/SH:     Last Reviewed on 12/28/2017 08:09 PM by Mikayla Galan    Past Medical History:         Attention Deficit Disorder: dx'd at age 2 yr;     Chicken pox: dx'd at age 1;         Surgical History:     NONE         Family History:         Paternal Grandfather: Hypertension     Paternal Grandmother: Carotid Artery Stenosis     Maternal Grandfather: Coronary Artery Disease; Myocardial Infarction     Maternal Grandmother: Coronary Artery Disease;  Type 2 Diabetes Father:    Positive for  Hypertension and Hyperlipidemia;     Mother: Healthy         Social History:     Occupation: [Name of Business]     Marital Status: Single         Tobacco/Alcohol/Supplements:     Last Reviewed on 9/04/2018 05:21 PM by Spurling, Sarah C    Tobacco: Current Smoker: He currently smokes every day, 1-5 cigarettes per day.          Alcohol: Frequency: Just on weekends         Substance Abuse History:     Last Reviewed on 7/25/2016 02:38 PM by Merly Ward        Marijuana: Current use.          Mental Health History:     Last Reviewed on 7/25/2016 02:38 PM by Merly Ward        Communicable Diseases (eg STDs):     Last Reviewed on 7/25/2016 02:38 PM by Merly Ward            Current Problems:     Last Reviewed on 6/12/2017 01:09 PM by Merly Ward    Acne     GERD     Cigarette smoking     Sprains and strains of knee and leg, NEC     Knee pain         Immunizations:     DTaP 4/15/1994     DTaP 6/9/1994     DTaP 8/18/1994     DTaP 3/22/1995     DTaP 5/12/1998     Td adult 7/1/2004     Hib HbOC (4-dose) 4/15/1994     Hib HbOC (4-dose) 6/9/1994     Hib HbOC (4-dose) 8/18/1994     Hib HbOC (4-dose) 3/22/1995     Hep B (pedi/adol, 3-dose schedule) 1993     Hep B (pedi/adol, 3-dose schedule) 4/15/1994     Hep B (pedi/adol, 3-dose schedule) 3/22/1995     Novartis Meningioccal or Menactra 6/13/2007     OPV  Poliovirus, live (oral) 4/15/1994     OPV  Poliovirus, live (oral) 6/9/1994     OPV  Poliovirus, live (oral) 8/18/1994     OPV  Poliovirus, live (oral) 5/12/1998     MMR  (Measles-Mumps-Rubella), live 3/12/1995     MMR  (Measles-Mumps-Rubella), live 5/12/1998     FluMist 8/31/2009         Allergies:     Last Reviewed on 9/04/2018 05:19 PM by Spurling, Sarah C      No Known Drug Allergies.         Current Medications:     Last Reviewed on 9/04/2018 05:21 PM by Spurling, Sarah C    Protonix 20mg Tablets, Delayed Release Take 1 tablet(s) by mouth daily         OBJECTIVE:        Vitals:          Current: 9/4/2018 5:22:28 PM    Ht:  5 ft, 11.25 in;  Wt: 310.4 lbs;  BMI: 43.0    T: 98.6 F (oral);  BP: 139/84 mm Hg (right arm, sitting);  P: 69 bpm (right arm (BP Cuff), sitting);  sCr: 1.11 mg/dL;  GFR: 125.81        Exams:     PHYSICAL EXAM:     GENERAL: Vitals recorded well developed, well nourished;  no apparent distress;     NECK: range of motion is normal;     RESPIRATORY: normal appearance and symmetric expansion of chest wall; normal respiratory rate and pattern with no distress; normal breath sounds with no rales, rhonchi, wheezes or rubs;     CARDIOVASCULAR: normal rate; rhythm is regular;  no edema;     LYMPHATIC: no enlargement of cervical or facial nodes; no supraclavicular nodes;     MUSCULOSKELETAL: normal gait; normal range of motion of all major muscle groups; pain with range of motion in: right knee flexion and internal rotation;     NEUROLOGIC: mental status: alert and oriented x 3;     PSYCHIATRIC: appropriate affect and demeanor; normal speech pattern; normal thought and perception;         ASSESSMENT           719.46   M25.561  Knee pain              DDx:     844.8   S83.401A  Sprains and strains of knee and leg, NEC              DDx:         ORDERS:         Meds Prescribed:       Diclofenac Sodium 75mg Tablets, Enteric Coated 1 tab bid with food  #30 (Thirty) tablet(s) Refills: 2         Radiology/Test Orders:       [id number]  Right radiologic examination, knee; three views  (Send-Out)                   PLAN:          Knee pain will get xray, recommend NSAIDs routinely over the next 2 weeks, elastic knee sleeve;  consider PT if no improvement after 3-5 days         RADIOLOGY:  I have ordered a right knee x-ray to be done today.            Prescriptions:       Diclofenac Sodium 75mg Tablets, Enteric Coated 1 tab bid with food  #30 (Thirty) tablet(s) Refills: 2           Orders:       [id number]  Right radiologic examination, knee; three views  (Send-Out)            Sprains and strains of knee  and leg, NEC as above             CHARGE CAPTURE           **Please note: ICD descriptions below are intended for billing purposes only and may not represent clinical diagnoses**        Primary Diagnosis:         719.46 Knee pain            M25.561    Pain in right knee              Orders:          [id number]   Office/outpatient visit; established patient, level 3  (In-House)           844.8 Sprains and strains of knee and leg, NEC            S83.401A    Sprain of unspecified collateral ligament of right knee, initial encounter

## 2021-05-18 NOTE — PROGRESS NOTES
Cachorro Tsai 1995     Office/Outpatient Visit    Visit Date: Wed, Oct 9, 2019 11:12 am    Provider: Daniela Shannon MD (Assistant: Ana Berg MA)    Location: [facility]        Electronically signed by Daniela Shannon MD on  10/14/2019 12:09:48 PM                             SUBJECTIVE:        CC: depression         HPI:         PHQ-9 Depression Screening: Completed form scanned and in chart; Total Score 20         Additionally, he presents with history of major depression, recurrent episode, moderate.  his current symptoms include anhedonia, anxious mood, a change in appetite, insomnia,  crying spells, decreased ability to concentrate, fatigue, guilt, sadness, feelings of worthlessness and tendency towards indecisiveness.  The depression has been evident for the past 7 to 9 months.  The symptoms are frequent, and present most days.  Currently, he is experiencing a moderate degree of depression.  Aguila admits to fleeting thoughts of suicide ( no desire to harm himself, just wishes he didnt have to think and feel this way; he denies a suicide plan and is able to contract with me ).  Recent stressors include breakup with girlfriend of 2 years, tough job.  Psychiatric history is significant for prior depressive episodes (one time previously).  he is not doing well at work, calls in sick b/c of depression, he is a  ON [employer] AND [employer] and these both help minimize his crying but nothing else helps.      ROS:     CONSTITUTIONAL:  Negative for chills, fatigue and fever.      EYES:  Negative for blurred vision.      E/N/T:  Negative for ear pain, frequent epistaxis and sore throat.      CARDIOVASCULAR:  Negative for chest pain, orthopnea, paroxysmal nocturnal dyspnea and pedal edema.      RESPIRATORY:  Negative for dyspnea and cough.      GASTROINTESTINAL:  Negative for abdominal pain, constipation, diarrhea, nausea and vomiting.      PSYCHIATRIC:  Positive for anxiety,  "depression, feelings of stress, anhedonia, sadness, sleep disturbance and suicidal thoughts ( fleeting thoughts, no plan ).          PMH/FMH/SH:     Last Reviewed on 10/09/2019 11:35 AM by Daniela Shannon    Past Medical History:         Attention Deficit Disorder: dx'd at age 2 yr;     Chicken pox: dx'd at age 1;         Surgical History:     NONE         Family History:         Paternal Grandfather: Hypertension     Paternal Grandmother: Carotid Artery Stenosis     Maternal Grandfather: Coronary Artery Disease; Myocardial Infarction     Maternal Grandmother: Coronary Artery Disease;  Type 2 Diabetes Father:    Positive for Hypertension and Hyperlipidemia;     Mother: Healthy         Social History:     Occupation: [employer]     Marital Status: Single     Children: None         Tobacco/Alcohol/Supplements:     Last Reviewed on 10/09/2019 11:35 AM by Daniela Shannon    Tobacco: Current Smoker: He currently smokes every day, 1 pack per day.          Alcohol: Frequency: Just on weekends         Substance Abuse History:     Last Reviewed on 10/09/2019 11:35 AM by Daniela Shannon        Marijuana: Current use.          Mental Health History:     Last Reviewed on 7/25/2016 02:38 PM by Merly Ward        Communicable Diseases (eg STDs):     Last Reviewed on 7/25/2016 02:38 PM by Merly Ward            Allergies:     Last Reviewed on 10/02/2019 10:17 AM by Naomy Nguyễn      No Known Drug Allergies.         Current Medications:     Last Reviewed on 10/02/2019 10:17 AM by Naomy Nguyễn    Buspirone HCl 15mg Tablet Take 1 tablet(s) by mouth bid prn     Sertraline HCl 50mg Tablet 1/2 a day for first week then one a day     Protonix 20mg Tablets, Delayed Release Take 1 tablet(s) by mouth daily     Diclofenac Sodium 75mg Tablets, Enteric Coated 1 tab bid with food     \"peak performance pills\"         OBJECTIVE:        Vitals:         Current: 10/9/2019 11:18:44 AM    Ht:  5 ft, 11.25 in;  Wt: 294.8 lbs;  " BMI: 40.8    T: 98.4 F (oral);  BP: 115/57 mm Hg (left arm, sitting);  P: 69 bpm (left arm (BP Cuff), sitting);  sCr: 1.11 mg/dL;  GFR: 122.05        Exams:     PHYSICAL EXAM:     GENERAL: Vitals recorded well developed, well nourished;  well groomed;  no apparent distress;     EYES: PERRL, EOMI     E/N/T: OROPHARYNX:  normal mucosa, dentition, gingiva, and posterior pharynx;     NECK:  supple, full ROM; no thyromegaly; no carotid bruits;     RESPIRATORY: normal respiratory rate and pattern with no distress; normal breath sounds with no rales, rhonchi, wheezes or rubs;     CARDIOVASCULAR: normal rate; rhythm is regular;  normal S1; normal S2; no systolic murmur; no cyanosis; no edema;     GASTROINTESTINAL: nontender, nondistended; no hepatosplenomegaly or masses; no bruits;     MUSCULOSKELETAL:  Normal range of motion, strength and tone;     NEUROLOGICAL:  cranial nerves, motor and sensory function, reflexes, gait and coordination are all intact;     PSYCHIATRIC: affect/demeanor: depressed, flat;         ASSESSMENT           V79.0   Z13.31  Screening for depression              DDx:     296.32   F33.1  Major depression, recurrent episode, moderate              DDx:     530.81   K21.9  GERD              DDx:         ORDERS:         Meds Prescribed:       Refill of: Sertraline HCl 100mg Tablet 1 po bid  #30 (Thirty) tablet(s) Refills: 2       Seroquel (Quetiapine Fumarate) 25mg Tablet 1 qhs  #30 (Thirty) tablet(s) Refills: 2       Cimetidine 400mg Tablet Take 1 tablet(s) by mouth bid  #60 (Sixty) tablet(s) Refills: 1         Procedures Ordered:       REFER  Referral to Specialist or Other Facility  (Send-Out)           Other Orders:         Depression screen positive and follow up plan documented  (In-House)                   PLAN:          Screening for depression         REFERRALS:  Referral initiated to a psychologist ( [facility] ).  [facility] PHQ-9 Depression Screening: Completed form scanned and in  chart; Total Score 20 Positive Depression Screen: Suicide Risk Assessment completed--denies suicidal/homicidal ideation; Pharmacologic intervention initiated/modified; 1 month           Orders:       REFER  Referral to Specialist or Other Facility  (Send-Out)           Depression screen positive and follow up plan documented  (In-House)            Major depression, recurrent episode, moderate I will increase his sertraline racing thoughts, worse in am, he wonders if he has bipolar referal to [facility] for therapy     School/Work Excuse for due to anxiety and depression, he is unable to pull overtime shifts.            Prescriptions:       Refill of: Sertraline HCl 100mg Tablet 1 po bid  #30 (Thirty) tablet(s) Refills: 2       Seroquel (Quetiapine Fumarate) 25mg Tablet 1 qhs  #30 (Thirty) tablet(s) Refills: 2       Cimetidine 400mg Tablet Take 1 tablet(s) by mouth bid  #60 (Sixty) tablet(s) Refills: 1          GERD change protonix to tagumet due to drug drug interaction             CHARGE CAPTURE           **Please note: ICD descriptions below are intended for billing purposes only and may not represent clinical diagnoses**        Primary Diagnosis:         V79.0 Screening for depression            Z13.31    Encounter for screening for depression              Orders:          [id number]   Office/outpatient visit; established patient, level 4  (In-House)                Depression screen positive and follow up plan documented  (In-House)           296.32 Major depression, recurrent episode, moderate            F33.1    Major depressive disorder, recurrent, moderate    530.81 GERD            K21.9    Gastro-esophageal reflux disease without esophagitis
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
## 2021-05-18 NOTE — PROGRESS NOTES
Quin Cachorro VARGASEd 1995     Office/Outpatient Visit    Visit Date: Fri, Jul 12, 2019 04:15 pm    Provider: Merly Ward N.P. (Assistant: Ana Berg MA)    Location: [facility]        Electronically signed by Merly Ward N.P. on  07/12/2019 05:26:49 PM                             SUBJECTIVE:        CC:     Aguila is a 23 year old White male.  He presents with depression/anxiety.          HPI:         In regard to the depression with anxiety, visit today is because of worsening symptoms.  The diagnosis of depression was made several years ago.  Presently, he admits to severe depression.  Currently not on any antidepressants.  Presently, Aguila admits to fleeting thoughts of suicide ( he denies a suicide plan and is able to contract with me ).  Regarding employment, he works full-time.   Additional history reveals abuse of alcohol and marijuana.          PHQ-9 Depression Screening: Completed form scanned and in chart; Total Score 21 Alcohol Consumption Screening: Completed form scanned and in chart; Total Score 9     ROS:     CONSTITUTIONAL:  Negative for chills, fatigue and fever.      CARDIOVASCULAR:  Negative for chest pain, orthopnea, paroxysmal nocturnal dyspnea and pedal edema.      RESPIRATORY:  Negative for dyspnea and cough.      PSYCHIATRIC:  Positive for anxiety, depression, feelings of stress, anhedonia, sadness, sleep disturbance and suicidal thoughts ( fleeting thoughts, no plan ).          PMH/FMH/SH:     Last Reviewed on 7/12/2019 04:25 PM by Merly Ward    Past Medical History:         Attention Deficit Disorder: dx'd at age 2 yr;     Chicken pox: dx'd at age 1;         Surgical History:     NONE         Family History:         Paternal Grandfather: Hypertension     Paternal Grandmother: Carotid Artery Stenosis     Maternal Grandfather: Coronary Artery Disease; Myocardial Infarction     Maternal Grandmother: Coronary Artery Disease;  Type 2 Diabetes  "Father:    Positive for Hypertension and Hyperlipidemia;     Mother: Healthy         Social History:     Occupation: [employer]     Marital Status: Single     Children: None         Tobacco/Alcohol/Supplements:     Last Reviewed on 7/12/2019 04:18 PM by Ana Berg    Tobacco: Current Smoker: He currently smokes every day, 1/2 to 1 pack per day.          Alcohol: Frequency: Just on weekends         Substance Abuse History:     Last Reviewed on 7/12/2019 04:25 PM by Merly Ward        Marijuana: Current use.          Mental Health History:     Last Reviewed on 7/25/2016 02:38 PM by Merly Ward        Communicable Diseases (eg STDs):     Last Reviewed on 7/25/2016 02:38 PM by Merly Ward            Current Problems:     Last Reviewed on 7/12/2019 04:25 PM by Merly Ward    Depression with anxiety     Acne     GERD     Cigarette smoking     Screening for depression         Immunizations:     DTaP 4/15/1994     DTaP 6/9/1994     DTaP 8/18/1994     DTaP 3/22/1995     DTaP 5/12/1998     Td adult 7/1/2004     Hib HbOC (4-dose) 4/15/1994     Hib HbOC (4-dose) 6/9/1994     Hib HbOC (4-dose) 8/18/1994     Hib HbOC (4-dose) 3/22/1995     Hep B (pedi/adol, 3-dose schedule) 1993     Hep B (pedi/adol, 3-dose schedule) 4/15/1994     Hep B (pedi/adol, 3-dose schedule) 3/22/1995     Novartis Meningioccal or Menactra 6/13/2007     OPV  Poliovirus, live (oral) 4/15/1994     OPV  Poliovirus, live (oral) 6/9/1994     OPV  Poliovirus, live (oral) 8/18/1994     OPV  Poliovirus, live (oral) 5/12/1998     MMR  (Measles-Mumps-Rubella), live 3/12/1995     MMR  (Measles-Mumps-Rubella), live 5/12/1998     FluMist 8/31/2009         Allergies:     Last Reviewed on 7/12/2019 04:18 PM by Ana Berg      No Known Drug Allergies.         Current Medications:     Last Reviewed on 7/12/2019 04:25 PM by Merly Ward    Protonix 20mg Tablets, Delayed Release Take 1 tablet(s) by mouth daily     \"peak performance " "pills\"         OBJECTIVE:        Vitals:         Historical:     05/15/2019  Wt:   308.4lbs        Current: 7/12/2019 4:21:26 PM    Ht:  5 ft, 11.25 in;  Wt: 300 lbs;  BMI: 41.5    T: 98.6 F (oral);  BP: 126/65 mm Hg (right arm, sitting);  P: 75 bpm (right arm (BP Cuff), sitting);  sCr: 1.11 mg/dL;  GFR: 122.96        Exams:     PHYSICAL EXAM:     GENERAL: Vitals recorded well developed, well nourished;  well groomed;  no apparent distress;     NECK:  supple, full ROM; no thyromegaly; no carotid bruits;     RESPIRATORY: normal respiratory rate and pattern with no distress; normal breath sounds with no rales, rhonchi, wheezes or rubs;     CARDIOVASCULAR: normal rate; rhythm is regular;  normal S1; normal S2; no systolic murmur; no cyanosis; no edema;     GASTROINTESTINAL: nontender, nondistended; no hepatosplenomegaly or masses; no bruits;     MUSCULOSKELETAL:  Normal range of motion, strength and tone;     NEUROLOGICAL:  cranial nerves, motor and sensory function, reflexes, gait and coordination are all intact;     PSYCHIATRIC: affect/demeanor: depressed, flat;         ASSESSMENT:           300.4   F43.23  Depression with anxiety              DDx:     V79.0   Z13.31  Screening for depression              DDx:         ORDERS:         Meds Prescribed:       Sertraline HCl 50mg Tablet 1/2 a day for first week then one a day  #30 (Thirty) tablet(s) Refills: 2       Buspirone HCl 15mg Tablet Take 1 tablet(s) by mouth bid prn  #30 (Thirty) tablet(s) Refills: 0         Procedures Ordered:       REFER  Referral to Specialist or Other Facility  (Send-Out)           Other Orders:         Depression screen positive and follow up plan documented  (In-House)                   PLAN:          Depression with anxiety         REFERRALS:  Referral initiated to a psychologist ( [facility]; for evaluation of depression/ anxiety/suicidal ideation ).  MIPS Positive Depression Screen: Suicide Risk Assessment " completed--denies suicidal/homicidal ideation; Referral will be initated to provider qualified to treat depression; Pharmacologic intervention initiated/modified School/Work Excuse for 7/10/19-7/12/19           Prescriptions:       Sertraline HCl 50mg Tablet 1/2 a day for first week then one a day  #30 (Thirty) tablet(s) Refills: 2       Buspirone HCl 15mg Tablet Take 1 tablet(s) by mouth bid prn  #30 (Thirty) tablet(s) Refills: 0           Orders:       REFER  Referral to Specialist or Other Facility  (Send-Out)           Depression screen positive and follow up plan documented  (In-House)            Screening for depression noted             CHARGE CAPTURE:           Primary Diagnosis:     300.4 Depression with anxiety            F43.23    Adjustment disorder with mixed anxiety and depressed mood              Orders:          [id number]   Office/outpatient visit; established patient, level 3  (In-House)                Depression screen positive and follow up plan documented  (In-House)           V79.0 Screening for depression            Z13.31    Encounter for screening for depression

## 2021-05-18 NOTE — PROGRESS NOTES
Quin Cachorro VARGASEd 1995     Office/Outpatient Visit    Visit Date: Fri, May 4, 2018 04:04 pm    Provider: Mikayla Galan N.P. (Assistant: Soledad Silveira)    Location: [facility]        Electronically signed by Mikayla Galan N.P. on  05/05/2018 09:11:46 AM                             SUBJECTIVE:        CC:     Aguila is a 22 year old White male.  left ankle sprain about 1 week ago         HPI: seen with elvis cruz student         Patient to be evaluated for ankle pain.  The pain is primarily in the left ankle.  Swelling is noted to be mild.  Pain radiates to the calf.  He characterizes it as moderate in intensity and sharp.  It began 5 days ago.  The precipitating event appears to have been came down on it sideways playing basketball.  Some pain relief is observed with compression and elevation.  Pain is aggravated with weight-bearing.  Pertinent past medical history is unremarkable.      ROS:     CONSTITUTIONAL:  Negative for chills, fatigue, fever, and weight change.      CARDIOVASCULAR:  Negative for chest pain.      RESPIRATORY:  Negative for cough, dyspnea, and hemoptysis.      MUSCULOSKELETAL:  Positive for arthralgias (left ankle).      NEUROLOGICAL:  Negative for dizziness, headaches, paresthesias, and weakness.          PMH/FMH/SH:     Last Reviewed on 12/28/2017 08:09 PM by Mikayla Galan    Past Medical History:         Attention Deficit Disorder: dx'd at age 2 yr;     Chicken pox: dx'd at age 1;         Surgical History:     NONE         Family History:         Paternal Grandfather: Hypertension     Paternal Grandmother: Carotid Artery Stenosis     Maternal Grandfather: Coronary Artery Disease; Myocardial Infarction     Maternal Grandmother: Coronary Artery Disease;  Type 2 Diabetes Father:    Positive for Hypertension and Hyperlipidemia;     Mother: Healthy         Social History:     Occupation: Unemployed     Marital Status: Single          Tobacco/Alcohol/Supplements:     Last Reviewed on 12/28/2017 02:41 PM by Marga Portillo    Tobacco: Current Smoker: He currently smokes every day, 1-5 cigarettes per day.          Alcohol: Frequency: Just on weekends         Substance Abuse History:     Last Reviewed on 7/25/2016 02:38 PM by Merly Ward        Marijuana: Current use.          Mental Health History:     Last Reviewed on 7/25/2016 02:38 PM by Merly Ward        Communicable Diseases (eg STDs):     Last Reviewed on 7/25/2016 02:38 PM by Merly Ward            Current Problems:     Last Reviewed on 6/12/2017 01:09 PM by Merly Ward    Bradycardia     Acne     GERD     Cigarette smoking         Immunizations:     DTaP 4/15/1994     DTaP 6/9/1994     DTaP 8/18/1994     DTaP 3/22/1995     DTaP 5/12/1998     Td adult 7/1/2004     Hib HbOC (4-dose) 4/15/1994     Hib HbOC (4-dose) 6/9/1994     Hib HbOC (4-dose) 8/18/1994     Hib HbOC (4-dose) 3/22/1995     Hep B (pedi/adol, 3-dose schedule) 1993     Hep B (pedi/adol, 3-dose schedule) 4/15/1994     Hep B (pedi/adol, 3-dose schedule) 3/22/1995     Novartis Meningioccal or Menactra 6/13/2007     OPV  Poliovirus, live (oral) 4/15/1994     OPV  Poliovirus, live (oral) 6/9/1994     OPV  Poliovirus, live (oral) 8/18/1994     OPV  Poliovirus, live (oral) 5/12/1998     MMR  (Measles-Mumps-Rubella), live 3/12/1995     MMR  (Measles-Mumps-Rubella), live 5/12/1998     FluMist 8/31/2009         Allergies:     Last Reviewed on 5/04/2018 04:06 PM by Soledad Silveira      No Known Drug Allergies.         Current Medications:     Last Reviewed on 5/04/2018 04:07 PM by Soledad Silveira    Protonix 20mg Tablets, Delayed Release Take 1 tablet(s) by mouth daily         OBJECTIVE:        Vitals:         Historical:     12/28/2017  BP:   134/73 mm Hg ( (left arm, , sitting, );)     12/28/2017  Wt:   294.1lbs        Current: 5/4/2018 4:06:33 PM    Ht:  5 ft, 11.25 in;  Wt: 302.5 lbs;  BMI: 41.9    T: 97.4 F  (oral);  BP: 139/89 mm Hg (left arm, sitting);  P: 75 bpm (left arm (BP Cuff), sitting);  sCr: 1.11 mg/dL;  GFR: 124.44        Exams:     LEFT ANKLE examination:     Inspection: normal skin, soft tissue and bony appearance with prominent malleoli, no gross edema or evidence of acute injury;     Palpation: diffuse pain;     Neurovascular: sensation intact to light touch and pain; brisk posterior tibial pulse and 2/4 Achilles tendon reflex     Range of Motion: limited active ROM with dorsiflexion, plantarflexion, inversion, and eversion; PHYSICAL EXAM:     GENERAL:  well developed and nourished; appropriately groomed; in no apparent distress;     RESPIRATORY:  lungs clear to auscultation and percussion; symmetric expansion; no dyspnea;     CARDIOVASCULAR: regular rate and rhythm; normal S1, S2; no murmur, rub, or gallop; normal PMI;    Peripheral Pulses: dorsalis pedis: 2+ L;  posterior tibial: 2+ L;  no edema;     MUSCULOSKELETAL: pain with range of motion in: left ankle flexion and extension;     NEUROLOGIC: mental status: alert and oriented x 3; GROSSLY INTACT     PSYCHIATRIC:  appropriate affect and demeanor; normal speech pattern; grossly normal memory;         ASSESSMENT           719.47   M25.572  Ankle pain              DDx:         ORDERS:         Radiology/Test Orders:       [id number]  Radiologic examination, left ankle; complete minimum 3 views  (Send-Out)         [id number]  Left radiologic examination, foot; complete, minimum of three views  (Send-Out)                   PLAN:          Ankle pain         RADIOLOGY:  I have ordered a left ankle xray and a left foot x-ray to be done today.      MEDICATIONS: Over-the-counter medications recommended include ibuprofen and Tylenol.      RECOMMENDATIONS given include: prevention of re-injury, avoidance of weight bearing, and RICE therapy.      FOLLOW-UP: Instructed to call if new or worsening symptoms develop.            Orders:       [id number]  Radiologic examination,  left ankle; complete minimum 3 views  (Send-Out)         [id number]  Left radiologic examination, foot; complete, minimum of three views  (Send-Out)               Patient Recommendations:        For  Ankle pain:     left ankle x-ray Use over-the-counter ibuprofen (i.e. Motrin, Advil) for pain and inflammation.  Avoid strenuous physical sports and other activities that are prone to cause re-injury during recovery. Avoid putting your full weight on the affected foot. Rest the foot and keep it elevated as much as possible. Apply ice over the affected area. Use a compression ankle wrap, such as an Ace bandage.  Call if you develop new symptoms such as fever, excessive swelling, increasing pain, numbness or coldness in the foot, chest pain, or difficulty breathing.              CHARGE CAPTURE           **Please note: ICD descriptions below are intended for billing purposes only and may not represent clinical diagnoses**        Primary Diagnosis:         719.47 Ankle pain            M25.572    Pain in left ankle and joints of left foot              Orders:          [id number]   Office/outpatient visit; established patient, level 3  (In-House)

## 2021-05-18 NOTE — PROGRESS NOTES
Quin Cachorro VARGASEd 1995     Office/Outpatient Visit    Visit Date: Mon, Sep 24, 2018 04:42 pm    Provider: Merly Ward N.P. (Assistant: Sarah Spurling, MA)    Location: [facility]        Electronically signed by Merly Ward N.P. on  09/24/2018 05:22:40 PM                             SUBJECTIVE:        CC:     Aguila is a 22 year old White male.  Chest pains started last night, it has been constant. No coughing, he said he feels fine.          HPI:         Atypical chest pain noted.  The discomfort is located primarily in the left axilla.  It radiates to the left arm.  The pain initially began two days ago.  He characterizes the pain as mild to moderate in intensity.  Associated symptoms include cough and myalgias ( left arm ).  He denies associated anxiety, nausea, vomiting, dyspnea, heartburn or belching.  The patient gives a history of prior chest discomfort similar to the symptoms described today.  Prior workup includes an echocardiogram ( normal ).  Cardiac risk factors include obesity.      ROS:     CONSTITUTIONAL:  Negative for chills, fatigue and fever.      CARDIOVASCULAR:  Negative for chest pain and pedal edema.      RESPIRATORY:  Negative for recent cough and dyspnea.      GASTROINTESTINAL:  Negative for acid reflux symptoms and heartburn.      MUSCULOSKELETAL:  Positive for limb pain ( left upper extremity pain ) and left chest wall pain.          PM/FM/:     Last Reviewed on 12/28/2017 08:09 PM by Mikayla Galan    Past Medical History:         Attention Deficit Disorder: dx'd at age 2 yr;     Chicken pox: dx'd at age 1;         Surgical History:     NONE         Family History:         Paternal Grandfather: Hypertension     Paternal Grandmother: Carotid Artery Stenosis     Maternal Grandfather: Coronary Artery Disease; Myocardial Infarction     Maternal Grandmother: Coronary Artery Disease;  Type 2 Diabetes Father:    Positive for Hypertension and  Hyperlipidemia;     Mother: Healthy         Social History:     Occupation: [Name of Business]     Marital Status: Single         Tobacco/Alcohol/Supplements:     Last Reviewed on 9/04/2018 05:21 PM by Spurling, Sarah C    Tobacco: Current Smoker: He currently smokes every day, 1-5 cigarettes per day.          Alcohol: Frequency: Just on weekends         Substance Abuse History:     Last Reviewed on 7/25/2016 02:38 PM by Merly Ward        Marijuana: Current use.          Mental Health History:     Last Reviewed on 7/25/2016 02:38 PM by Merly Ward        Communicable Diseases (eg STDs):     Last Reviewed on 7/25/2016 02:38 PM by Merly Ward            Current Problems:     Last Reviewed on 6/12/2017 01:09 PM by Merly Ward    Acne     GERD     Cigarette smoking     Sprains and strains of knee and leg, NEC     Knee pain         Immunizations:     DTaP 4/15/1994     DTaP 6/9/1994     DTaP 8/18/1994     DTaP 3/22/1995     DTaP 5/12/1998     Td adult 7/1/2004     Hib HbOC (4-dose) 4/15/1994     Hib HbOC (4-dose) 6/9/1994     Hib HbOC (4-dose) 8/18/1994     Hib HbOC (4-dose) 3/22/1995     Hep B (pedi/adol, 3-dose schedule) 1993     Hep B (pedi/adol, 3-dose schedule) 4/15/1994     Hep B (pedi/adol, 3-dose schedule) 3/22/1995     Novartis Meningioccal or Menactra 6/13/2007     OPV  Poliovirus, live (oral) 4/15/1994     OPV  Poliovirus, live (oral) 6/9/1994     OPV  Poliovirus, live (oral) 8/18/1994     OPV  Poliovirus, live (oral) 5/12/1998     MMR  (Measles-Mumps-Rubella), live 3/12/1995     MMR  (Measles-Mumps-Rubella), live 5/12/1998     FluMist 8/31/2009         Allergies:     Last Reviewed on 9/04/2018 05:19 PM by Spurling, Sarah C      No Known Drug Allergies.         Current Medications:     Last Reviewed on 9/04/2018 05:21 PM by Spurling, Sarah C    Protonix 20mg Tablets, Delayed Release Take 1 tablet(s) by mouth daily     Diclofenac Sodium 75mg Tablets, Enteric Coated 1 tab bid with  food         OBJECTIVE:        Vitals:         Current: 9/24/2018 4:48:55 PM    Ht:  5 ft, 11.25 in;  Wt: 304.8 lbs;  BMI: 42.2    T: 98.3 F (oral);  BP: 136/60 mm Hg (left arm, sitting);  P: 70 bpm (left arm (BP Cuff), sitting);  sCr: 1.11 mg/dL;  GFR: 124.85        Exams:     PHYSICAL EXAM:     GENERAL: Vitals recorded well developed, well nourished;  no apparent distress;     NECK: range of motion is normal;     RESPIRATORY: normal appearance and symmetric expansion of chest wall; normal respiratory rate and pattern with no distress; normal breath sounds with no rales, rhonchi, wheezes or rubs;     CARDIOVASCULAR: normal rate; rhythm is regular;     GASTROINTESTINAL: nontender; normal bowel sounds;     LYMPHATIC: no enlargement of cervical or facial nodes; no supraclavicular nodes;     MUSCULOSKELETAL: normal gait; normal range of motion of all major muscle groups; no limb or joint pain with range of motion;     NEUROLOGIC: mental status: alert and oriented x 3;     PSYCHIATRIC: appropriate affect and demeanor; normal speech pattern; normal thought and perception;         Lab/Test Results:         LABORATORY RESULTS: EKG performed by tls         ASSESSMENT           733.6   M94.0  Costochondritis              DDx:         ORDERS:         Meds Prescribed:       Ibuprofen 800mg Tablet Take 1 tablet by mouth every 12 hours as needed for pain, take with food.  #28 (Twenty Eight) tablet(s) Refills: 1         Radiology/Test Orders:       [id number]  Electrocardiogram, routine with at least 12 leads; with interpretation and report  (In-House)                   PLAN:          Costochondritis         TESTS/PROCEDURES:  Will proceed with an ECG to be performed/scheduled now.  School/Work Excuse for Today Tomorrow           Prescriptions:       Ibuprofen 800mg Tablet Take 1 tablet by mouth every 12 hours as needed for pain, take with food.  #28 (Twenty Eight) tablet(s) Refills: 1           Orders:       [id number]  Electrocardiogram,  routine with at least 12 leads; with interpretation and report  (In-House)             Patient Education Handouts:       Costochondritis              CHARGE CAPTURE           **Please note: ICD descriptions below are intended for billing purposes only and may not represent clinical diagnoses**        Primary Diagnosis:         733.6 Costochondritis            M94.0    Chondrocostal junction syndrome [[place]]              Orders:          [id number]   Office/outpatient visit; established patient, level 3  (In-House)             10501   Electrocardiogram, routine with at least 12 leads; with interpretation and report  (In-House)

## 2021-05-18 NOTE — PROGRESS NOTES
Quin Cachorro VARGASEd 1995     Office/Outpatient Visit    Visit Date: Wed, May 15, 2019 11:23 am    Provider: Merly Ward N.P. (Assistant: Sarah Spurling, MA)    Location: [facility]        Electronically signed by Merly Ward N.P. on  05/15/2019 11:48:30 AM                             SUBJECTIVE:        CC:     Aguila is a 23 year old White male.  This is a follow-up visit.          HPI:         GERD noted.  The location of the discomfort is primarily epigastric.  ok on the medication taking pantoprazole daily and is unable to stop due to severity of reflux when he does     ROS:     CONSTITUTIONAL:  Negative for chills, fatigue and fever.      CARDIOVASCULAR:  Negative for chest pain and pedal edema.      RESPIRATORY:  Negative for recent cough and dyspnea.      GASTROINTESTINAL:  Positive for acid reflux symptoms ( controlled on medicatin, but if not taking - severe ).   Negative for abdominal pain, constipation, diarrhea, heartburn, nausea or vomiting.      GENITOURINARY:  Negative for dysuria and change in urine stream.      MUSCULOSKELETAL:  Negative for arthralgias and myalgias.      INTEGUMENTARY:  Negative for pruritis and rash.      NEUROLOGICAL:  Negative for dizziness, fainting, headaches and paresthesias.      ENDOCRINE:  Negative for hair loss, polydipsia and polyphagia.      ALLERGIC/IMMUNOLOGIC:  Negative for seasonal allergies.      PSYCHIATRIC:  Negative for anxiety, depression and suicidal thoughts.          PMH/FMH/SH:     Last Reviewed on 12/28/2017 08:09 PM by Mikayla Galan    Past Medical History:         Attention Deficit Disorder: dx'd at age 2 yr;     Chicken pox: dx'd at age 1;         Surgical History:     NONE         Family History:         Paternal Grandfather: Hypertension     Paternal Grandmother: Carotid Artery Stenosis     Maternal Grandfather: Coronary Artery Disease; Myocardial Infarction     Maternal Grandmother: Coronary Artery Disease;  Type  2 Diabetes Father:    Positive for Hypertension and Hyperlipidemia;     Mother: Healthy         Social History:     Occupation: [Name of Business]     Marital Status: Single         Tobacco/Alcohol/Supplements:     Last Reviewed on 5/15/2019 11:30 AM by Spurling, Sarah C    Tobacco: He has a past history of cigarette smoking; quit date:  September 2018.          Alcohol: Frequency: Just on weekends         Substance Abuse History:     Last Reviewed on 7/25/2016 02:38 PM by Merly Ward        Marijuana: Current use.          Mental Health History:     Last Reviewed on 7/25/2016 02:38 PM by Merly Ward        Communicable Diseases (eg STDs):     Last Reviewed on 7/25/2016 02:38 PM by Merly Ward            Current Problems:     Last Reviewed on 6/12/2017 01:09 PM by Merly Ward    Acne     GERD     Cigarette smoking         Immunizations:     DTaP 4/15/1994     DTaP 6/9/1994     DTaP 8/18/1994     DTaP 3/22/1995     DTaP 5/12/1998     Td adult 7/1/2004     Hib HbOC (4-dose) 4/15/1994     Hib HbOC (4-dose) 6/9/1994     Hib HbOC (4-dose) 8/18/1994     Hib HbOC (4-dose) 3/22/1995     Hep B (pedi/adol, 3-dose schedule) 1995     Hep B (pedi/adol, 3-dose schedule) 4/15/1994     Hep B (pedi/adol, 3-dose schedule) 3/22/1995     Novartis Meningioccal or Menactra 6/13/2007     OPV  Poliovirus, live (oral) 4/15/1994     OPV  Poliovirus, live (oral) 6/9/1994     OPV  Poliovirus, live (oral) 8/18/1994     OPV  Poliovirus, live (oral) 5/12/1998     MMR  (Measles-Mumps-Rubella), live 3/12/1995     MMR  (Measles-Mumps-Rubella), live 5/12/1998     FluMist 8/31/2009         Allergies:     Last Reviewed on 5/15/2019 11:30 AM by Spurling, Sarah C      No Known Drug Allergies.         Current Medications:     Last Reviewed on 5/15/2019 11:30 AM by Spurling, Sarah C    Protonix 20mg Tablets, Delayed Release Take 1 tablet(s) by mouth daily         OBJECTIVE:        Vitals:         Current: 5/15/2019 11:32:23  AM    Ht:  5 ft, 11.25 in;  Wt: 308.4 lbs;  BMI: 42.7    T: 98.2 F (oral);  BP: 124/71 mm Hg (left arm, sitting);  P: 63 bpm (left arm (BP Cuff), sitting);  sCr: 1.11 mg/dL;  GFR: 124.41        Exams:     PHYSICAL EXAM:     GENERAL: Vitals recorded well developed, well nourished;  no apparent distress;     RESPIRATORY: normal appearance and symmetric expansion of chest wall; normal respiratory rate and pattern with no distress; normal breath sounds with no rales, rhonchi, wheezes or rubs;     CARDIOVASCULAR: normal rate; rhythm is regular;  no edema;     GASTROINTESTINAL: nontender; normal bowel sounds;     MUSCULOSKELETAL: normal gait; normal range of motion of all major muscle groups; no limb or joint pain with range of motion;     NEUROLOGIC: mental status: alert and oriented x 3;     PSYCHIATRIC: appropriate affect and demeanor; normal speech pattern; normal thought and perception;         ASSESSMENT           530.81   K21.9  GERD              DDx:         ORDERS:         Meds Prescribed:       Refill of: Protonix (Pantoprazole) 20mg Tablets, Delayed Release Take 1 tablet(s) by mouth daily  #30 (Thirty) tablet(s) Refills: 5         Lab Orders:       [id number]  [facility] Comp. Metabolic Panel  (Send-Out)                   PLAN:          GERD will check labs , discussed would like for him not to have to be on this long term unless we evaluate his acid reflux and make sure no underlying problem  - consider EGD in future     LABORATORY:  Labs ordered to be performed today include Comprehensive metabolic panel.            Prescriptions:       Refill of: Protonix (Pantoprazole) 20mg Tablets, Delayed Release Take 1 tablet(s) by mouth daily  #30 (Thirty) tablet(s) Refills: 5           Orders:       [id number]  [facility] Comp. Metabolic Panel  (Send-Out)             Patient Education Handouts:       [facility]              CHARGE CAPTURE           **Please note: ICD descriptions below are intended for billing  purposes only and may not represent clinical diagnoses**        Primary Diagnosis:         530.81 GERD            K21.9    Gastro-esophageal reflux disease without esophagitis              Orders:          [id number]   Office/outpatient visit; established patient, level 3  (In-House)

## 2021-07-01 VITALS
TEMPERATURE: 98.2 F | HEIGHT: 71 IN | HEART RATE: 63 BPM | WEIGHT: 308.4 LBS | SYSTOLIC BLOOD PRESSURE: 124 MMHG | BODY MASS INDEX: 43.18 KG/M2 | DIASTOLIC BLOOD PRESSURE: 71 MMHG

## 2021-07-01 VITALS
TEMPERATURE: 98.4 F | SYSTOLIC BLOOD PRESSURE: 115 MMHG | DIASTOLIC BLOOD PRESSURE: 57 MMHG | HEIGHT: 71 IN | HEART RATE: 69 BPM | WEIGHT: 294.8 LBS | BODY MASS INDEX: 41.27 KG/M2

## 2021-07-01 VITALS
TEMPERATURE: 98.3 F | SYSTOLIC BLOOD PRESSURE: 136 MMHG | BODY MASS INDEX: 42.67 KG/M2 | WEIGHT: 304.8 LBS | HEIGHT: 71 IN | DIASTOLIC BLOOD PRESSURE: 60 MMHG | HEART RATE: 70 BPM

## 2021-07-01 VITALS
HEIGHT: 71 IN | BODY MASS INDEX: 43.45 KG/M2 | WEIGHT: 310.4 LBS | HEART RATE: 69 BPM | TEMPERATURE: 98.6 F | SYSTOLIC BLOOD PRESSURE: 139 MMHG | DIASTOLIC BLOOD PRESSURE: 84 MMHG

## 2021-07-01 VITALS
WEIGHT: 293 LBS | DIASTOLIC BLOOD PRESSURE: 81 MMHG | TEMPERATURE: 98.4 F | SYSTOLIC BLOOD PRESSURE: 125 MMHG | HEIGHT: 71 IN | HEART RATE: 67 BPM | BODY MASS INDEX: 41.02 KG/M2

## 2021-07-01 VITALS
TEMPERATURE: 98.9 F | WEIGHT: 295.4 LBS | HEIGHT: 71 IN | HEART RATE: 90 BPM | BODY MASS INDEX: 41.35 KG/M2 | SYSTOLIC BLOOD PRESSURE: 125 MMHG | DIASTOLIC BLOOD PRESSURE: 63 MMHG

## 2021-07-01 VITALS
DIASTOLIC BLOOD PRESSURE: 89 MMHG | HEART RATE: 75 BPM | HEIGHT: 71 IN | BODY MASS INDEX: 42.35 KG/M2 | WEIGHT: 302.5 LBS | TEMPERATURE: 97.4 F | SYSTOLIC BLOOD PRESSURE: 139 MMHG

## 2021-07-01 VITALS
HEIGHT: 71 IN | TEMPERATURE: 98.6 F | SYSTOLIC BLOOD PRESSURE: 126 MMHG | WEIGHT: 300 LBS | DIASTOLIC BLOOD PRESSURE: 65 MMHG | BODY MASS INDEX: 42 KG/M2 | HEART RATE: 75 BPM

## 2021-10-13 ENCOUNTER — TELEPHONE (OUTPATIENT)
Dept: FAMILY MEDICINE CLINIC | Age: 26
End: 2021-10-13

## 2021-10-13 NOTE — TELEPHONE ENCOUNTER
Pt is having discharge and burning from his penis. He wants to know if we could possibly work him in today or instruct him on what to do.

## 2021-10-15 ENCOUNTER — OFFICE VISIT (OUTPATIENT)
Dept: FAMILY MEDICINE CLINIC | Age: 26
End: 2021-10-15

## 2021-10-15 VITALS
SYSTOLIC BLOOD PRESSURE: 129 MMHG | HEIGHT: 72 IN | DIASTOLIC BLOOD PRESSURE: 80 MMHG | WEIGHT: 278 LBS | TEMPERATURE: 98.2 F | HEART RATE: 86 BPM | BODY MASS INDEX: 37.65 KG/M2

## 2021-10-15 DIAGNOSIS — N34.2 URETHRITIS: Primary | ICD-10-CM

## 2021-10-15 LAB
C TRACH RRNA CVX QL NAA+PROBE: NOT DETECTED
N GONORRHOEA RRNA SPEC QL NAA+PROBE: DETECTED

## 2021-10-15 PROCEDURE — 87591 N.GONORRHOEAE DNA AMP PROB: CPT | Performed by: FAMILY MEDICINE

## 2021-10-15 PROCEDURE — 87491 CHLMYD TRACH DNA AMP PROBE: CPT | Performed by: FAMILY MEDICINE

## 2021-10-15 PROCEDURE — 99213 OFFICE O/P EST LOW 20 MIN: CPT | Performed by: FAMILY MEDICINE

## 2021-10-15 PROCEDURE — 96372 THER/PROPH/DIAG INJ SC/IM: CPT | Performed by: FAMILY MEDICINE

## 2021-10-15 RX ORDER — CEFTRIAXONE 1 G/1
0.5 INJECTION, POWDER, FOR SOLUTION INTRAMUSCULAR; INTRAVENOUS EVERY 24 HOURS
Status: COMPLETED | OUTPATIENT
Start: 2021-10-15 | End: 2021-10-15

## 2021-10-15 RX ORDER — DOXYCYCLINE 100 MG/1
100 TABLET ORAL 2 TIMES DAILY
Qty: 14 TABLET | Refills: 0 | Status: SHIPPED | OUTPATIENT
Start: 2021-10-15

## 2021-10-15 RX ADMIN — CEFTRIAXONE 0.5 G: 1 INJECTION, POWDER, FOR SOLUTION INTRAMUSCULAR; INTRAVENOUS at 15:49

## 2021-10-15 NOTE — PATIENT INSTRUCTIONS
Urethritis, Adult    Urethritis is swelling (inflammation) of the urethra. The urethra is the tube that drains urine from the bladder. It is important to get treatment for this condition early. Delayed treatment may lead to complications.  What are the causes?  This condition may be caused by:  · Germs that are spread through sexual contact. This is the leading cause of urethritis. This may include bacterial or viral infections.  · Injury to the urethra. Injury can happen after a thin, flexible tube (catheter) is inserted into the urethra to drain urine, or after medical instruments or foreign bodies are inserted into the area.  · Chemical irritation. This may include contact with spermicide.  · A disease that causes inflammation. This is rare.  What increases the risk?  The following factors may make you more likely to develop this condition:  · Having sex without using a condom.  · Having multiple sexual partners.  · Having poor hygiene.  What are the signs or symptoms?  Symptoms of this condition include:  · Pain with urination.  · Frequent urination.  · Urgent need to urinate.  · Itching and pain in the vagina or penis.  · Discharge coming from the penis.  However, women rarely have symptoms.  How is this diagnosed?  This condition is diagnosed based on your medical history and symptoms as well as a physical exam. Tests may also be done. These may include:  · Urine tests.  · Swabs from the urethra.  How is this treated?  Treatment for this condition depends on the cause. Urethritis caused by a bacterial infection is treated with antibiotic medicine. Any sexual partners must also be treated.  Follow these instructions at home:  Medicines  · Take over-the-counter and prescription medicines only as told by your health care provider.  · If you were prescribed antibiotic medicine, take it as told by your health care provider. Do not stop taking the antibiotic even if you start to feel better.  Lifestyle  · Avoid  using perfumed soaps, bubble bath, and shampoo when you bathe or shower. Rinse the vaginal area after bathing.  · Wear cotton underwear. Not wearing underwear when going to bed can help.  · Make sure to wipe from front to back after using the toilet if you are female.  · Do not have sex until your health care provider approves. When you do have sex, be sure to practice safe sex.  · Tell anyone with whom you have had sexual relations in the past 60 days that he or she may be at risk of infection.  General instructions  · Drink enough fluid to keep your urine clear or pale yellow.  · It is up to you to get your test results. Ask your health care provider, or the department that is doing the test, when your results will be ready.  · Keep all follow-up visits as told by your health care provider. This is important.  · Get tested again 3 months after treatment to make sure the infection is gone. It is important that your sexual partner also gets tested again.  Contact a health care provider if:  · Your symptoms have not improved after 3 days.  · Your symptoms get worse.  · You have eye redness or pain.  · You develop abdominal pain or pelvic pain (in females).  · You develop joint pain.  · You have a fever.  Get help right away if:  · You have severe pain in the belly, back, or side.  · You vomit repeatedly.  Summary  · Urethritis is a swelling (inflammation) of the urethra.  · This condition is caused by germs that are spread through sexual contact. This is the main cause of this illness.  · It is important to get treatment for this condition early. Delayed treatment may lead to complications.  · Treatment for this condition depends on the cause. Any sexual partners must also be treated.  This information is not intended to replace advice given to you by your health care provider. Make sure you discuss any questions you have with your health care provider.  Document Revised: 11/30/2018 Document Reviewed:  01/23/2018  Elsevier Patient Education © 2021 [employer]

## 2021-10-15 NOTE — PROGRESS NOTES
Cachorro Tsai presents to [facility].    Chief Complaint:  Possible gonorrhea    Subjective       History of Present Illness:  Aguila is in today for evaluation of possible sexually transmitted infection.  He has noted some discolored drainage from his penis over the last couple of days.  He does have some discomfort when he urinates.  He has had a different sexual partner.  He thinks his urine is okay.  However, he is noted this discharge out on his underwear.  Aguila denies any history of sexually transmitted infection.      Review of Systems:  Review of Systems   Constitutional: Negative for chills and fever.   Respiratory: Negative for cough and shortness of breath.    Cardiovascular: Negative for chest pain and palpitations.   Gastrointestinal: Negative for abdominal pain, nausea and vomiting.        Objective   Medical History:  No past medical history on file.  Past Surgical History:   • WISDOM TOOTH EXTRACTION      Family History   Problem Relation Age of Onset   • Diabetes Other    • Hypertension Other      Social History     Tobacco Use   • Smoking status: Former Smoker     Quit date: 2021     Years since quittin.0   • Smokeless tobacco: Never Used   Substance Use Topics   • Alcohol use: Yes       Health Maintenance Due   Topic Date Due   • ANNUAL PHYSICAL  Never done   • Pneumococcal Vaccine 0-64 (1 of 2 - PPSV23) Never done   • COVID-19 Vaccine (1) Never done   • TDAP/TD VACCINES (1 - Tdap) Never done   • HEPATITIS C SCREENING  Never done   • INFLUENZA VACCINE  Never done        There is no immunization history for the selected administration types on file for this patient.    No Known Allergies     Medications:  Current Outpatient Medications on File Prior to Visit   Medication Sig   • oxyCODONE-acetaminophen (PERCOCET) 5-325 MG per tablet Take 1 tablet by mouth Every 8 (Eight) Hours As Needed for Moderate Pain  or Severe Pain .   • pantoprazole  "(PROTONIX) 20 MG EC tablet Take 20 mg by mouth Daily.   • HYDROcodone-acetaminophen (NORCO) 5-325 MG per tablet 1 oral Q12H PRN severe pain     No current facility-administered medications on file prior to visit.       Vital Signs:   /80 (BP Location: Right arm, Patient Position: Sitting)   Pulse 86   Temp 98.2 °F (36.8 °C) (Oral)   Ht 182.9 cm (72.01\")   Wt 126 kg (278 lb)   BMI 37.70 kg/m²       Physical Exam:  Physical Exam  Vitals and nursing note reviewed.   Constitutional:       General: He is not in acute distress.     Appearance: He is not ill-appearing.   HENT:      Right Ear: Tympanic membrane and ear canal normal.      Left Ear: Tympanic membrane and ear canal normal.      Mouth/Throat:      Mouth: Mucous membranes are moist.      Comments: Pharynx appears normal  Eyes:      Extraocular Movements: Extraocular movements intact.      Pupils: Pupils are equal, round, and reactive to light.   Neck:      Thyroid: No thyromegaly.   Cardiovascular:      Rate and Rhythm: Normal rate and regular rhythm.      Heart sounds: No murmur heard.      Pulmonary:      Effort: Pulmonary effort is normal.      Breath sounds: Normal breath sounds.   Abdominal:      General: There is no distension.      Palpations: Abdomen is soft. There is no mass.      Tenderness: There is no abdominal tenderness.   Musculoskeletal:      Cervical back: Normal range of motion.   Skin:     Findings: No lesion or rash.   Neurological:      General: No focal deficit present.      Mental Status: He is oriented to person, place, and time.      Cranial Nerves: No cranial nerve deficit.   Psychiatric:         Mood and Affect: Mood normal.         Result Review      The following data was reviewed by Tristan Lopez MD on 10/15/2021.  No results found for: WBC, HGB, HCT, MCV, PLT  No results found for: GLUCOSE, BUN, CREATININE, EGFRIFNONA, EGFRIFAFRI, BCR, K, CO2, CALCIUM, PROTENTOTREF, ALBUMIN, LABIL2, BILIRUBIN, AST, ALT  No " results found for: CHOL, CHLPL, TRIG, HDL, LDL, LDLDIRECT  No results found for: TSH  No results found for: HGBA1C             Assessment and Plan:   Today, we have reviewed Aguila's care.  Based on his symptoms and recent sexual activity, it is likely that he has some type of sexually transmitted infection.  We will go ahead and treat him presumptively for both gonorrhea and chlamydia.  Urine testing will be done as below.  I have recommended he avoid sexual activity for at least 7 days.  If his symptoms do not resolve, he has been instructed to contact us.  Also, we will reach out to him in about 90 days to repeat testing and then to also consider blood work to screen for HIV and other sexually transmitted infections.       Diagnoses and all orders for this visit:    1. Urethritis (Primary)  -     Chlamydia trachomatis, Neisseria gonorrhoeae, PCR - Urine, Urine, Random Void  -     cefTRIAXone (ROCEPHIN) injection 0.5 g  -     doxycycline (ADOXA) 100 MG tablet; Take 1 tablet by mouth 2 (Two) Times a Day.  Dispense: 14 tablet; Refill: 0          Follow Up   Return if symptoms worsen or fail to improve.  Patient was given instructions and counseling regarding his condition or for health maintenance advice. Please see specific information pulled into the AVS if appropriate.

## 2022-01-18 DIAGNOSIS — Z11.3 SCREENING FOR STDS (SEXUALLY TRANSMITTED DISEASES): Primary | ICD-10-CM

## 2022-01-18 NOTE — PROGRESS NOTES
Please let Aguila know that I have reviewed his chart.  I would recommend moving ahead with repeat testing for gonorrhea at this point and have placed an order for this.  Also, I would recommend additional screening with blood work for sexually transmitted infections.  I typically will screen for viral hepatitis, syphilis, and HIV.      There is an antibody test for genital herpes that can be done, but I usually do not recommend it unless the patient has previously had a blistering, painful rash in the groin area.  The antibody test for herpes is confusing because the same virus that causes cold sores can potentially cause this antibody testing to be positive.    Having said that, if he would like me to order the antibody test, I can.  He may move ahead with the testing ordered at his convenience.  The blood work does not need to be fasting.  Let me know if he has other concerns.  Thanks.

## 2022-02-11 PROBLEM — M10.9 GOUT: Status: ACTIVE | Noted: 2022-02-11

## 2022-03-02 DIAGNOSIS — Z11.3 SCREENING FOR STDS (SEXUALLY TRANSMITTED DISEASES): Primary | ICD-10-CM

## 2022-03-16 ENCOUNTER — TELEPHONE (OUTPATIENT)
Dept: FAMILY MEDICINE CLINIC | Age: 27
End: 2022-03-16

## 2024-02-14 ENCOUNTER — OFFICE VISIT (OUTPATIENT)
Dept: FAMILY MEDICINE CLINIC | Age: 29
End: 2024-02-14
Payer: COMMERCIAL

## 2024-02-14 VITALS
BODY MASS INDEX: 34.57 KG/M2 | TEMPERATURE: 97.5 F | HEART RATE: 56 BPM | HEIGHT: 72 IN | WEIGHT: 255.2 LBS | DIASTOLIC BLOOD PRESSURE: 62 MMHG | SYSTOLIC BLOOD PRESSURE: 122 MMHG

## 2024-02-14 DIAGNOSIS — R21 RASH: Primary | ICD-10-CM

## 2024-02-14 PROCEDURE — 99213 OFFICE O/P EST LOW 20 MIN: CPT | Performed by: NURSE PRACTITIONER

## 2024-02-14 RX ORDER — METHYLPREDNISOLONE 4 MG/1
TABLET ORAL
Qty: 21 TABLET | Refills: 0 | Status: SHIPPED | OUTPATIENT
Start: 2024-02-14

## 2024-04-16 ENCOUNTER — OFFICE VISIT (OUTPATIENT)
Dept: FAMILY MEDICINE CLINIC | Age: 29
End: 2024-04-16
Payer: COMMERCIAL

## 2024-04-16 VITALS
HEIGHT: 72 IN | OXYGEN SATURATION: 98 % | BODY MASS INDEX: 33.08 KG/M2 | HEART RATE: 60 BPM | WEIGHT: 244.2 LBS | SYSTOLIC BLOOD PRESSURE: 112 MMHG | DIASTOLIC BLOOD PRESSURE: 74 MMHG | TEMPERATURE: 97.6 F

## 2024-04-16 DIAGNOSIS — H60.503 ACUTE OTITIS EXTERNA OF BOTH EARS, UNSPECIFIED TYPE: Primary | ICD-10-CM

## 2024-04-16 DIAGNOSIS — H61.893 EAR CANAL DRYNESS, BILATERAL: ICD-10-CM

## 2024-04-16 PROCEDURE — 99214 OFFICE O/P EST MOD 30 MIN: CPT | Performed by: NURSE PRACTITIONER

## 2024-04-16 RX ORDER — CIPROFLOXACIN AND DEXAMETHASONE 3; 1 MG/ML; MG/ML
4 SUSPENSION/ DROPS AURICULAR (OTIC) 2 TIMES DAILY
Qty: 7.5 ML | Refills: 0 | Status: SHIPPED | OUTPATIENT
Start: 2024-04-16 | End: 2024-04-21

## 2024-04-16 RX ORDER — FLUOCINOLONE ACETONIDE 0.11 MG/ML
OIL AURICULAR (OTIC) 2 TIMES DAILY
Qty: 20 ML | Refills: 0 | Status: SHIPPED | OUTPATIENT
Start: 2024-04-16

## 2024-04-16 NOTE — PROGRESS NOTES
Chief Complaint  Cachorro Tsai presents to [facility] for Ear Drainage (Bloody ear drainage , bilateral . X 1 month on and off )      Subjective     History of Present Illness  Aguila is in for follow up on ear pain/drainage from a 2/14/24 office visit.  Complaints of bloody drainage from ear - treated with medrol pack. Reports that steroid pack made no improvements in symptoms. He uses Q-tips daily. Reports that his pillow has small specks of blood on it when he wakes up in the morning. He denies any fever, chills, or ear pain but does complain of swollen bumps behind right ear.         Assessment and Plan       Diagnoses and all orders for this visit:    1. Acute otitis externa of both ears, unspecified type (Primary)  Comments:  Begin ciprodex given lymphadenopathy and length of s/s. Transition to oil drops after. Discussed Q-tips may be cause of abrasions leading to bloody discharge.  Orders:  -     ciprofloxacin-dexAMETHasone (Ciprodex) 0.3-0.1 % otic suspension; Administer 4 drops into both ears 2 (Two) Times a Day for 5 days.  Dispense: 7.5 mL; Refill: 0    2. Ear canal dryness, bilateral  Comments:  Transition to oil drops after ciprodex. Discussed Q-tips may be cause of abrasions leading to bloody drainage. Consider ENT referral if no improvement.  Orders:  -     fluocinolone acetonide (DERMOTIC) 0.01 % oil otic oil; Administer  into both ears 2 (Two) Times a Day. Apply 2-3 drops per ear  START AFTER ANTIBIOTIC DROPS COMPLETE  Dispense: 20 mL; Refill: 0            Follow Up   Return in about 4 weeks (around 5/14/2024), or if symptoms worsen or fail to improve, for Recheck.  No future appointments.    New Medications Ordered This Visit   Medications    fluocinolone acetonide (DERMOTIC) 0.01 % oil otic oil     Sig: Administer  into both ears 2 (Two) Times a Day. Apply 2-3 drops per ear  START AFTER ANTIBIOTIC DROPS COMPLETE     Dispense:  20 mL     Refill:  0     "ciprofloxacin-dexAMETHasone (Ciprodex) 0.3-0.1 % otic suspension     Sig: Administer 4 drops into both ears 2 (Two) Times a Day for 5 days.     Dispense:  7.5 mL     Refill:  0       Medications Discontinued During This Encounter   Medication Reason    methylPREDNISolone (MEDROL) 4 MG dose pack *Therapy completed          Review of Systems   Constitutional:  Negative for chills, fatigue and fever.   HENT:  Positive for ear discharge. Negative for congestion, ear pain, postnasal drip, rhinorrhea, sinus pressure and sore throat.    Respiratory:  Negative for cough and shortness of breath.    Musculoskeletal:  Negative for myalgias.   Skin:  Negative for rash.   Neurological:  Negative for dizziness and headache.       Objective     Vitals:    04/16/24 1444   BP: 112/74   BP Location: Left arm   Patient Position: Sitting   Cuff Size: Adult   Pulse: 60   Temp: 97.6 °F (36.4 °C)   TempSrc: Oral   SpO2: 98%   Weight: 111 kg (244 lb 3.2 oz)   Height: 182.9 cm (72.01\")     Body mass index is 33.11 kg/m².          Physical Exam  Vitals and nursing note reviewed.   Constitutional:       General: He is not in acute distress.     Appearance: Normal appearance.   HENT:      Head: Normocephalic.      Right Ear: Tympanic membrane normal. Drainage (Flaking) present.      Left Ear: Tympanic membrane normal. Drainage (flaking) present.      Ears:        Comments: Abrasions/scabbing bilaterally to opening of ear canal. Right postauricular lymph node is swollen.      Nose: No congestion.   Eyes:      Pupils: Pupils are equal, round, and reactive to light.   Cardiovascular:      Rate and Rhythm: Normal rate and regular rhythm.      Heart sounds: Normal heart sounds.   Pulmonary:      Effort: Pulmonary effort is normal. No respiratory distress.      Breath sounds: Normal breath sounds.   Skin:     General: Skin is warm.   Neurological:      Mental Status: He is alert and oriented to person, place, and time.   Psychiatric:         Mood " and Affect: Mood normal.         Behavior: Behavior normal.            Result Review               No Known Allergies   No past medical history on file.  Current Outpatient Medications   Medication Sig Dispense Refill    ciprofloxacin-dexAMETHasone (Ciprodex) 0.3-0.1 % otic suspension Administer 4 drops into both ears 2 (Two) Times a Day for 5 days. 7.5 mL 0    fluocinolone acetonide (DERMOTIC) 0.01 % oil otic oil Administer  into both ears 2 (Two) Times a Day. Apply 2-3 drops per ear  START AFTER ANTIBIOTIC DROPS COMPLETE 20 mL 0     No current facility-administered medications for this visit.     Past Surgical History:   Procedure Laterality Date    WISDOM TOOTH EXTRACTION        Health Maintenance Due   Topic Date Due    TDAP/TD VACCINES (1 - Tdap) Never done    HEPATITIS C SCREENING  Never done    ANNUAL PHYSICAL  Never done      There is no immunization history for the selected administration types on file for this patient.      Part of this note may be an electronic transcription/translation of spoken language to printed   text using the Dragon Dictation System.      JUAN CARLOS Boyd

## 2024-06-03 ENCOUNTER — HOSPITAL ENCOUNTER (OUTPATIENT)
Dept: GENERAL RADIOLOGY | Facility: HOSPITAL | Age: 29
Discharge: HOME | End: 2024-06-03
Admitting: PHYSICIAN ASSISTANT
Payer: COMMERCIAL

## 2024-06-03 ENCOUNTER — OFFICE VISIT (OUTPATIENT)
Dept: FAMILY MEDICINE CLINIC | Age: 29
End: 2024-06-03
Payer: COMMERCIAL

## 2024-06-03 VITALS
BODY MASS INDEX: 33.86 KG/M2 | DIASTOLIC BLOOD PRESSURE: 57 MMHG | OXYGEN SATURATION: 100 % | HEART RATE: 56 BPM | HEIGHT: 72 IN | SYSTOLIC BLOOD PRESSURE: 134 MMHG | WEIGHT: 250 LBS | TEMPERATURE: 98.2 F

## 2024-06-03 DIAGNOSIS — S93.601A SPRAIN OF RIGHT FOOT, INITIAL ENCOUNTER: ICD-10-CM

## 2024-06-03 DIAGNOSIS — S92.414A NONDISPLACED FRACTURE OF PROXIMAL PHALANX OF RIGHT GREAT TOE, INITIAL ENCOUNTER FOR CLOSED FRACTURE: Primary | ICD-10-CM

## 2024-06-03 DIAGNOSIS — M79.671 RIGHT FOOT PAIN: ICD-10-CM

## 2024-06-03 PROCEDURE — 73630 X-RAY EXAM OF FOOT: CPT

## 2024-06-03 PROCEDURE — 99213 OFFICE O/P EST LOW 20 MIN: CPT | Performed by: PHYSICIAN ASSISTANT

## 2024-06-03 RX ORDER — ACETAMINOPHEN 500 MG
1000 TABLET ORAL ONCE
Status: COMPLETED | OUTPATIENT
Start: 2024-06-03 | End: 2024-06-03

## 2024-06-03 RX ADMIN — Medication 1000 MG: at 16:28

## 2024-06-03 NOTE — LETTER
Yisel 3, 2024     Patient: Cachorro Tsai   YOB: 1995   Date of Visit: 6/3/2024       To Whom It May Concern:    It is my medical opinion that Cachorro Tsai should be excused from work tonight, for shift starting on 6/3/2024.          Sincerely,          Ellie Pierre PA-C    CC: No Recipients

## 2024-06-03 NOTE — PATIENT INSTRUCTIONS
Aguila, thank you for letting me care for you today!     You can alternate 400 mg of ibuprofen and 1000 mg of Tylenol every 6 hours as needed to help with your pain. If it has been more than 6 hours since you have taken either of these medications it is safe to take them together at the dose as stated above. If you do this, you do need to wait 6 hours before taking either 1 again.     Sprains typically heal within 1 to 2 weeks.  The worst pain you will experience is the first 2 to 3 days and then it should improve from there.  During this time you can take NSAIDs or Tylenol, apply ice to the area, and if appropriate wear a compression device such as a splint or Ace bandage. You should rest as much as possible and avoid activities where you are likely to injure yourself again. If your pain is not improving with these measures after the first week, or if your pain persists longer than 2 weeks please follow-up with your primary care provider for further evaluation.

## 2024-06-03 NOTE — PROGRESS NOTES
Subjective     CHIEF COMPLAINT    Chief Complaint   Patient presents with    Foot Pain     Right foot pain - fell and hit foot on dog crate x 1 day ago            History of Present Illness  This is a 28-year-old male presenting to the clinic complaining of right foot pain since yesterday.  He was moving a mattress when he tripped and hit the top of his foot on a metal dog crate.  He has had pain, bruising and swelling across the top of his foot and at the base of his toes since then.            Review of Systems   Constitutional:  Negative for chills and fever.   Musculoskeletal:  Positive for arthralgias and gait problem. Negative for joint swelling and myalgias.   Skin:  Negative for wound.   Neurological:  Negative for weakness and numbness.            History reviewed. No pertinent past medical history.         Past Surgical History:   Procedure Laterality Date    WISDOM TOOTH EXTRACTION              Family History   Problem Relation Age of Onset    Diabetes Other     Hypertension Other             Social History     Socioeconomic History    Marital status: Single   Tobacco Use    Smoking status: Former     Current packs/day: 0.00     Types: Cigarettes     Quit date: 2021     Years since quittin.6    Smokeless tobacco: Never   Vaping Use    Vaping status: Every Day    Substances: Nicotine, THC, Flavoring    Devices: Disposable   Substance and Sexual Activity    Alcohol use: Yes    Drug use: Defer    Sexual activity: Defer            No Known Allergies         Current Outpatient Medications on File Prior to Visit   Medication Sig Dispense Refill    [DISCONTINUED] fluocinolone acetonide (DERMOTIC) 0.01 % oil otic oil Administer  into both ears 2 (Two) Times a Day. Apply 2-3 drops per ear  START AFTER ANTIBIOTIC DROPS COMPLETE (Patient not taking: Reported on 6/3/2024) 20 mL 0     No current facility-administered medications on file prior to visit.            /57 (BP Location: Right arm, Patient  "Position: Sitting, Cuff Size: Large Adult)   Pulse 56   Temp 98.2 °F (36.8 °C) (Oral)   Ht 182.9 cm (72.01\")   Wt 113 kg (250 lb)   SpO2 100%   BMI 33.90 kg/m²          Objective     Physical Exam  Vitals and nursing note reviewed.   Constitutional:       General: He is not in acute distress.     Appearance: Normal appearance.   Pulmonary:      Effort: Pulmonary effort is normal. No respiratory distress.   Musculoskeletal:      Left ankle: Normal. No swelling, deformity or ecchymosis. No tenderness. Normal range of motion.      Left Achilles Tendon: Normal. No tenderness.      Left foot: Decreased range of motion (unable to dorsiflex toes due to pain). Swelling and tenderness present. No deformity. Normal pulse.   Skin:     General: Skin is warm and dry.      Findings: Bruising present. No erythema.   Neurological:      Mental Status: He is alert and oriented to person, place, and time.   Psychiatric:         Mood and Affect: Mood normal.         Behavior: Behavior normal.             XR Foot 3+ View Right    Result Date: 6/3/2024  XR FOOT 3+ VW RIGHT-  Date of Exam: 6/3/2024 4:43 PM  Indication: right foot injury x yesterday; M79.671-Pain in right foot  Comparison: None available.  Findings: The bone mineral density is normal. Soft tissue swelling. There is a small fracture fragment along the medial aspect base of the proximal phalanx great toe. It is nondisplaced. It measures 4 mm.. No radiopaque foreign bodies. No joint space narrowing. Large type II navicular.      Impression: 4 mm nondisplaced fracture fragment along the medial base of the proximal phalanx great toe with associated soft tissue swelling.   Electronically Signed By-Wendy Dozier MD On:6/3/2024 4:46 PM        Orthopedic Injury Treatment    Date/Time: 6/3/2024 5:10 PM    Performed by: Abby Delgado MA  Authorized by: Ellie Pierre PA-C  Injury location: foot  Location details: right foot  Injury type: soft tissue (soft tissue injury of " foot and fracture of great toe)  Pre-procedure neurovascular assessment: neurovascularly intact  Immobilization: splint  Splint type: post-op shoe.  Post-procedure neurovascular assessment: post-procedure neurovascularly intact  Patient tolerance: patient tolerated the procedure well with no immediate complications           Assessment & Plan  Nondisplaced fracture of proximal phalanx of right great toe, initial encounter for closed fracture  RICE care discussed.  Recommend taking Tylenol and ibuprofen and he was provided specific dosing and timing instructions on his AVS.  Sprain of right foot, initial encounter  Aguila does not have any fractures in his midfoot, but he does have significant swelling, bruising and pain at this time.  On repeat exam after acetaminophen he was able to dorsiflex his toes somewhat, but range of motion is definitely decreased still.  Therefore I have placed a referral to podiatry for both the toe fracture and to the sprain to ensure proper healing and evaluate for any potential soft tissue complications.  Right foot pain      Orders Placed This Encounter   Procedures    XR Foot 3+ View Right    Ambulatory Referral to Podiatry     New Medications Ordered This Visit   Medications    acetaminophen (TYLENOL) tablet 1,000 mg                FOR FULL DISCHARGE INSTRUCTIONS/COMMENTS/HANDOUTS please see the   AVS

## 2024-06-04 ENCOUNTER — TELEPHONE (OUTPATIENT)
Dept: PODIATRY | Facility: CLINIC | Age: 29
End: 2024-06-04
Payer: COMMERCIAL

## 2025-06-05 ENCOUNTER — OFFICE VISIT (OUTPATIENT)
Dept: FAMILY MEDICINE CLINIC | Age: 30
End: 2025-06-05
Payer: COMMERCIAL

## 2025-06-05 VITALS
OXYGEN SATURATION: 98 % | DIASTOLIC BLOOD PRESSURE: 51 MMHG | TEMPERATURE: 97.9 F | HEIGHT: 72 IN | WEIGHT: 257.2 LBS | HEART RATE: 62 BPM | BODY MASS INDEX: 34.84 KG/M2 | SYSTOLIC BLOOD PRESSURE: 123 MMHG

## 2025-06-05 DIAGNOSIS — G89.29 CHRONIC BILATERAL LOW BACK PAIN WITH RIGHT-SIDED SCIATICA: Primary | ICD-10-CM

## 2025-06-05 DIAGNOSIS — M54.41 CHRONIC BILATERAL LOW BACK PAIN WITH RIGHT-SIDED SCIATICA: Primary | ICD-10-CM

## 2025-06-05 PROCEDURE — 99213 OFFICE O/P EST LOW 20 MIN: CPT | Performed by: NURSE PRACTITIONER

## 2025-06-05 NOTE — PROGRESS NOTES
"Chief Complaint  Cachorro Tsai presents to [facility] for Back Pain ((R) hip, and knee pain x 2 months. Pt states the pain comes and goes )    Subjective     History of Present Illness  Aguila is in today with lower back pain radiating from his right lower back down into his right leg and into his knee.  He reports that this has been going on for about 2 months but will come and go.  He reports that the pain is worse when sitting.  He had a couple of episodes where it came on abruptly and linger for few days but then returned after a few days.  He has not been doing a whole lot to help with the pain at home but is concerned as his dad has had some severe back problems in the past and required surgery.  He denies any numbness or tingling or weakness in his lower extremities.  Denies any bowel or bladder issues.  He does not have any swelling in his leg.  He denies any back injury.    Assessment and Plan     Diagnoses and all orders for this visit:    1. Chronic bilateral low back pain with right-sided sciatica (Primary)  Comments:  I would advise on stretching exercises, consider anti-inflammatory over-the-counter.  Will start with an x-ray to determine if underlying cause follow-up PRN  Orders:  -     XR Spine Lumbar 2 or 3 View; Future            Follow Up   Return if symptoms worsen or fail to improve.  Future Appointments   Date Time Provider Department Center   6/19/2025  2:45 PM Merly Ward APRN [facility]       No orders of the defined types were placed in this encounter.      There are no discontinued medications.       Review of Systems    Objective     Vitals:    06/05/25 1150   BP: 123/51   BP Location: Left arm   Patient Position: Sitting   Cuff Size: Large Adult   Pulse: 62   Temp: 97.9 °F (36.6 °C)   TempSrc: Oral   SpO2: 98%   Weight: 117 kg (257 lb 3.2 oz)   Height: 182.9 cm (72.01\")         Physical Exam  Vitals reviewed.   Constitutional:     "   Appearance: Normal appearance.   HENT:      Head: Normocephalic.   Eyes:      Pupils: Pupils are equal, round, and reactive to light.   Cardiovascular:      Rate and Rhythm: Normal rate and regular rhythm.      Heart sounds: No murmur heard.  Pulmonary:      Effort: Pulmonary effort is normal.      Breath sounds: Normal breath sounds.   Musculoskeletal:      Lumbar back: Positive right straight leg raise test.   Neurological:      Mental Status: He is alert.   Psychiatric:         Mood and Affect: Mood normal.         Behavior: Behavior normal.               Result Review        No Known Allergies   Past Medical History:   Diagnosis Date    ADHD (attention deficit hyperactivity disorder)     Anxiety     Depression     Low back pain      No current outpatient medications on file.     No current facility-administered medications for this visit.     Past Surgical History:   Procedure Laterality Date    FRACTURE SURGERY      WISDOM TOOTH EXTRACTION        Health Maintenance Due   Topic Date Due    Pneumococcal Vaccine 0-49 (1 of 2 - PCV) Never done    TDAP/TD VACCINES (1 - Tdap) Never done    HEPATITIS C SCREENING  Never done    ANNUAL PHYSICAL  Never done    COVID-19 Vaccine (1 - 2024-25 season) Never done      There is no immunization history for the selected administration types on file for this patient.      Part of this note may be an electronic transcription/translation of spoken language to printed   text using the Dragon Dictation System.      JUAN CARLOS Boyd

## 2025-06-06 ENCOUNTER — TELEPHONE (OUTPATIENT)
Dept: FAMILY MEDICINE CLINIC | Age: 30
End: 2025-06-06
Payer: COMMERCIAL

## 2025-06-06 NOTE — TELEPHONE ENCOUNTER
"Caller: Cachorro Tsai \"Aguila\"    Relationship: Self    Best call back number: [phone]     What form or medical record are you requesting: WORK EXCUSE    Who is requesting this form or medical record from you: PATIENT    How would you like to receive the form or medical records (pick-up, mail, fax): FAX  If fax, what is the fax number: [phone]      Timeframe paperwork needed: ASAP    Additional notes:   "

## 2025-06-30 ENCOUNTER — TELEPHONE (OUTPATIENT)
Dept: FAMILY MEDICINE CLINIC | Age: 30
End: 2025-06-30
Payer: COMMERCIAL

## 2025-07-07 NOTE — TELEPHONE ENCOUNTER
2nd attempt - no answer/vm - called pt in regards to overdue imaging ordered by pcp on 6/5/25/letter mailed

## 2025-07-10 ENCOUNTER — TELEPHONE (OUTPATIENT)
Dept: FAMILY MEDICINE CLINIC | Age: 30
End: 2025-07-10
Payer: COMMERCIAL

## 2025-07-10 NOTE — TELEPHONE ENCOUNTER
HUB TO READ-LVM FOR PATIENT TO MAKE AN ESTABLISH NEW PROVIDER APPOINTMENT-DUE TO NATHALY LOPEZ LEAVING THE PRACTICE IN SEPTEMBER